# Patient Record
Sex: FEMALE | Race: WHITE | NOT HISPANIC OR LATINO | Employment: UNEMPLOYED | URBAN - METROPOLITAN AREA
[De-identification: names, ages, dates, MRNs, and addresses within clinical notes are randomized per-mention and may not be internally consistent; named-entity substitution may affect disease eponyms.]

---

## 2017-02-09 ENCOUNTER — OFFICE VISIT (OUTPATIENT)
Dept: OBSTETRICS AND GYNECOLOGY | Facility: CLINIC | Age: 57
End: 2017-02-09
Payer: COMMERCIAL

## 2017-02-09 ENCOUNTER — HOSPITAL ENCOUNTER (OUTPATIENT)
Dept: RADIOLOGY | Facility: CLINIC | Age: 57
Discharge: HOME | End: 2017-02-09
Attending: OBSTETRICS & GYNECOLOGY
Payer: COMMERCIAL

## 2017-02-09 ENCOUNTER — TELEPHONE (OUTPATIENT)
Dept: UROLOGY | Facility: CLINIC | Age: 57
End: 2017-02-09

## 2017-02-09 ENCOUNTER — TELEPHONE (OUTPATIENT)
Dept: OBSTETRICS AND GYNECOLOGY | Facility: CLINIC | Age: 57
End: 2017-02-09

## 2017-02-09 VITALS
SYSTOLIC BLOOD PRESSURE: 140 MMHG | HEART RATE: 81 BPM | BODY MASS INDEX: 28.67 KG/M2 | WEIGHT: 142.19 LBS | DIASTOLIC BLOOD PRESSURE: 88 MMHG | HEIGHT: 59 IN

## 2017-02-09 DIAGNOSIS — R31.9 HEMATURIA: ICD-10-CM

## 2017-02-09 DIAGNOSIS — R39.9 UTI SYMPTOMS: ICD-10-CM

## 2017-02-09 DIAGNOSIS — N30.10 INTERSTITIAL CYSTITIS: ICD-10-CM

## 2017-02-09 DIAGNOSIS — R10.2 PELVIC PAIN IN FEMALE: ICD-10-CM

## 2017-02-09 DIAGNOSIS — N95.2 VAGINAL ATROPHY: Primary | ICD-10-CM

## 2017-02-09 DIAGNOSIS — Z12.31 OTHER SCREENING MAMMOGRAM: ICD-10-CM

## 2017-02-09 LAB
BILIRUB SERPL-MCNC: NORMAL MG/DL
BLOOD URINE, POC: 50
COLOR, POC UA: NORMAL
GLUCOSE UR QL STRIP: NORMAL
KETONES UR QL STRIP: NORMAL
LEUKOCYTE ESTERASE URINE, POC: NORMAL
NITRITE, POC UA: NORMAL
PH, POC UA: 5
PROTEIN, POC: NORMAL
SPECIFIC GRAVITY, POC UA: NORMAL
UROBILINOGEN, POC UA: NORMAL

## 2017-02-09 PROCEDURE — 81002 URINALYSIS NONAUTO W/O SCOPE: CPT | Mod: S$GLB,,, | Performed by: OBSTETRICS & GYNECOLOGY

## 2017-02-09 PROCEDURE — 87086 URINE CULTURE/COLONY COUNT: CPT

## 2017-02-09 PROCEDURE — 77063 BREAST TOMOSYNTHESIS BI: CPT | Mod: 26,,, | Performed by: RADIOLOGY

## 2017-02-09 PROCEDURE — 99999 PR PBB SHADOW E&M-EST. PATIENT-LVL III: CPT | Mod: PBBFAC,,, | Performed by: OBSTETRICS & GYNECOLOGY

## 2017-02-09 PROCEDURE — 99214 OFFICE O/P EST MOD 30 MIN: CPT | Mod: 25,S$GLB,, | Performed by: OBSTETRICS & GYNECOLOGY

## 2017-02-09 PROCEDURE — 77067 SCR MAMMO BI INCL CAD: CPT | Mod: TC

## 2017-02-09 PROCEDURE — 77067 SCR MAMMO BI INCL CAD: CPT | Mod: 26,,, | Performed by: RADIOLOGY

## 2017-02-09 RX ORDER — OMEPRAZOLE 20 MG/1
20 TABLET, DELAYED RELEASE ORAL DAILY
COMMUNITY

## 2017-02-09 RX ORDER — ACETAMINOPHEN 160 MG/5ML
200 SUSPENSION, ORAL (FINAL DOSE FORM) ORAL DAILY
COMMUNITY

## 2017-02-09 RX ORDER — MULTIVITAMIN
1 TABLET ORAL DAILY
COMMUNITY

## 2017-02-09 RX ORDER — ESTERIFIED ESTROGEN AND METHYLTESTOSTERONE .625; 1.25 MG/1; MG/1
1 TABLET ORAL DAILY
Qty: 30 TABLET | Refills: 5 | Status: SHIPPED | OUTPATIENT
Start: 2017-02-09 | End: 2017-11-13 | Stop reason: SDUPTHER

## 2017-02-09 NOTE — MR AVS SNAPSHOT
[facility]  [address] [place] [address]  Phone: [phone]                  Brenna Gaytan   2017 10:40 AM   Office Visit    Description:  Female : 1960   Provider:  Moses Venegas MD   Department:  [facility]           Reason for Visit     Pelvic Pain           Diagnoses this Visit        Comments    UTI symptoms    -  Primary            To Do List           Goals (5 Years of Data)     None      [facility]     [facility] Care [facility] -  Assistance  Registered nurses in the [facility] On Call Center provide clinical advisement, health education, appointment booking, and other advisory services.  Call for this free service at [phone].             Medications           Message regarding Medications     Verify the changes and/or additions to your medication regime listed below are the same as discussed with your clinician today.  If any of these changes or additions are incorrect, please notify your healthcare provider.        STOP taking these medications     esomeprazole (NEXIUM) 40 MG capsule Take 40 mg by mouth before breakfast.           Verify that the below list of medications is an accurate representation of the medications you are currently taking.  If none reported, the list may be blank. If incorrect, please contact your healthcare provider. Carry this list with you in case of emergency.           Current Medications     coenzyme Q10 (CO Q-10) 200 mg capsule Take 200 mg by mouth once daily.    colestipol (COLESTID) 1 gram Tab Take 2 g by mouth once daily.     DOCOSAHEXANOIC ACID/EPA (FISH OIL ORAL) Take 2 capsules by mouth once daily.    estrogens,conjugated,-methyltestosterone 0.625-1.25mg (ESTRATEST HS) 0.625-1.25 mg per tablet Take 1 tablet by mouth once daily.    multivitamin (ONE DAILY MULTIVITAMIN) per tablet Take 1 tablet by mouth once daily.    omeprazole (PRILOSEC OTC) 20 MG tablet Take 20 mg by mouth once daily.          "  Clinical Reference Information           Your Vitals Were     BP Pulse Height Weight BMI    140/88 (BP Location: Right arm, Patient Position: Sitting, BP Method: Manual) 81 4' 11" (1.499 m) 64.5 kg (142 lb 3.2 oz) 28.72 kg/m2      Blood Pressure          Most Recent Value    BP  (!)  140/88      Allergies as of 2/9/2017     No Known Allergies      Immunizations Administered on Date of Encounter - 2/9/2017     None      Orders Placed During Today's Visit      Normal Orders This Visit    POCT URINE DIPSTICK WITHOUT MICROSCOPE     Urine culture          2/9/2017 10:48 AM - Maria Guadalupe Dennison LPN      Component Results     Component    Color, UA    Spec Grav, UA    pH, UA    5    WBC, UA    neg    Nitrite, UA    neg    Protein, UA    neg    Glucose, UA    neg    Ketones, UA    trace    Urobilinogen, UA    neg    Bilirubin, UA    neg    Blood, UA    50            Language Assistance Services     ATTENTION: Language assistance services are available, free of charge. Please call [phone].      ATENCIÓN: Si habla español, tiene a robles disposición servicios gratuitos de asistencia lingüística. Llame al [phone].     CHÚ Ý: N?u b?n nói Ti?ng Vi?t, có các d?ch v? h? tr? ngôn ng? mi?n phí dành cho b?n. G?i s? [phone].         [facility] complies with applicable Federal civil rights laws and does not discriminate on the basis of race, color, national origin, age, disability, or sex.        "

## 2017-02-09 NOTE — TELEPHONE ENCOUNTER
----- Message from Flor Lea sent at 2/9/2017 12:18 PM CST -----  Contact: self  Patient would like to be seen sooner than 02/15/16 due to blood in urine. Please call patient at [phone]. Thanks!

## 2017-02-09 NOTE — TELEPHONE ENCOUNTER
----- Message from Barry Mancuso sent at 2/9/2017 12:14 PM CST -----  Contact: pt  Pt is requesting callback, needs to know if her home medication was called in  Call Back#[phone]  Thanks

## 2017-02-09 NOTE — PROGRESS NOTES
"Chief Complaint   Patient presents with    Pelvic Pain       History of Present Illness   56 y.o.  female patient presents today for pelvic pain and bladder pain - request referral to new urology, will arrange. Has hematuria, h/o I.C. Not taking Hormone Replacement Therapy  Regularly and overdue for mammogram. No bowel complaints.     Past medical and surgical history reviewed.   I have reviewed the patient's medical history in detail and updated the computerized patient record.    Review of patient's allergies indicates:  No Known Allergies      Review of Systems - Negative except HPI  GEN ROS: negative for - chills or fever  Breast ROS: negative for breast lumps  Genito-Urinary ROS: no trouble voiding       Physical Examination:  Visit Vitals    BP (!) 140/88 (BP Location: Right arm, Patient Position: Sitting, BP Method: Manual)    Pulse 81    Ht 4' 11" (1.499 m)    Wt 64.5 kg (142 lb 3.2 oz)    BMI 28.72 kg/m2    Constitutional: She is oriented to person, place, and time. She appears well-developed and well-nourished. No distress.   HENT:   Head: Normocephalic and atraumatic.   Eyes: Conjunctivae and EOM are normal. No scleral icterus.   Neck: Normal range of motion. Neck supple. No tracheal deviation present.   Cardiovascular: Normal rate.    Pulmonary/Chest: Effort normal. No respiratory distress. She exhibits no tenderness.  Breasts: are symmetrical.   Right breast exhibits no inverted nipple, no mass, no nipple discharge, no skin change and no tenderness.   Left breast exhibits no inverted nipple, no mass, no nipple discharge, no skin change and no tenderness.  Abdominal: Soft. She exhibits no distension and no mass. There is no tenderness. There is no rebound and no guarding.   Genitourinary:    External rectal exam shows no thrombosed external hemorrhoids.    Pelvic exam was performed with patient supine.   No labial fusion.   There is no rash, lesion or injury on the right labia.   There is " no rash, lesion or injury on the left labia.   No bleeding and no signs of injury around the vaginal introitus, urethra is without lesions and well supported.    No vaginal discharge found. Pale and atrophic   No significant Cystocele, Enterocele or rectocele, and cuff well supported.   Bimanual exam:   The urethra and vagina are without palpable masses, tender over full bladder.    Uterus and cervix are surgically absents, vaginal cuff is intact and well supported.   Right adnexum displays no mass and no tenderness.   Left adnexum displays no mass and no tenderness.  Musculoskeletal: Normal range of motion.   Neurological: She is alert and oriented to person, place, and time. Coordination normal.   Skin: Skin is warm and dry. She is not diaphoretic.   Psychiatric: She has a normal mood and affect.          Assessment:  Vaginal atrophy - noncompliant with HRT  1. UTI symptoms  POCT URINE DIPSTICK WITHOUT MICROSCOPE    Urine culture    Ambulatory Referral to Urology   2. Other screening mammogram  CANCELED: Mammo Digital Screening Bilat With CAD   3. Hematuria  Ambulatory Referral to Urology   4. Interstitial cystitis  Ambulatory Referral to Urology       Plan:  estratest refill  Mammogram  Urine Culture  Urology referral

## 2017-02-10 ENCOUNTER — OFFICE VISIT (OUTPATIENT)
Dept: UROLOGY | Facility: CLINIC | Age: 57
End: 2017-02-10
Payer: COMMERCIAL

## 2017-02-10 VITALS
HEIGHT: 59 IN | HEART RATE: 85 BPM | SYSTOLIC BLOOD PRESSURE: 133 MMHG | WEIGHT: 142.19 LBS | DIASTOLIC BLOOD PRESSURE: 79 MMHG | BODY MASS INDEX: 28.67 KG/M2

## 2017-02-10 DIAGNOSIS — R31.29 HEMATURIA, MICROSCOPIC: ICD-10-CM

## 2017-02-10 DIAGNOSIS — R33.9 URINARY RETENTION: ICD-10-CM

## 2017-02-10 DIAGNOSIS — R31.9 HEMATURIA: Primary | ICD-10-CM

## 2017-02-10 LAB
BACTERIA UR CULT: NO GROWTH
BILIRUB SERPL-MCNC: NEGATIVE MG/DL
BLOOD URINE, POC: 50
COLOR, POC UA: NORMAL
GLUCOSE UR QL STRIP: NEGATIVE
KETONES UR QL STRIP: NEGATIVE
LEUKOCYTE ESTERASE URINE, POC: NEGATIVE
NITRITE, POC UA: NEGATIVE
PH, POC UA: 5
POC RESIDUAL URINE VOLUME: 182 ML (ref 0–100)
PROTEIN, POC: NEGATIVE
SPECIFIC GRAVITY, POC UA: 1.01
UROBILINOGEN, POC UA: NEGATIVE

## 2017-02-10 PROCEDURE — 99204 OFFICE O/P NEW MOD 45 MIN: CPT | Mod: 25,S$GLB,, | Performed by: UROLOGY

## 2017-02-10 PROCEDURE — 99999 PR PBB SHADOW E&M-EST. PATIENT-LVL III: CPT | Mod: PBBFAC,,, | Performed by: UROLOGY

## 2017-02-10 PROCEDURE — 81002 URINALYSIS NONAUTO W/O SCOPE: CPT | Mod: S$GLB,,, | Performed by: UROLOGY

## 2017-02-10 PROCEDURE — 51798 US URINE CAPACITY MEASURE: CPT | Mod: S$GLB,,, | Performed by: UROLOGY

## 2017-02-10 RX ORDER — DIAZEPAM 5 MG/1
5 TABLET ORAL ONCE
Qty: 1 TABLET | Refills: 0 | Status: SHIPPED | OUTPATIENT
Start: 2017-02-10 | End: 2018-06-29

## 2017-02-10 NOTE — LETTER
February 10, 2017      Moses Venegas MD  [address] [facility]  [address]  [facility] [address]           [facility]  [address] [facility] [address] [place] [address]  Phone: [phone]          Patient: Brenna Gaytan   MR Number: [id number]   YOB: 1960   Date of Visit: 2/10/2017       Dear Dr. Moses Venegas:    Thank you for referring Brenna Gaytan to me for evaluation. Attached you will find relevant portions of my assessment and plan of care.    If you have questions, please do not hesitate to call me. I look forward to following Brenna Gaytan along with you.    Sincerely,    OLIVIA Souza MD    Enclosure  CC:  No Recipients    If you would like to receive this communication electronically, please contact [email or web address] or [phone] to request more information on [facility] Link access.    For providers and/or their staff who would like to refer a patient to [facility], please contact us through our one-stop-shop provider referral line, [facility] , at [phone].    If you feel you have received this communication in error or would no longer like to receive these types of communications, please e-mail [email or web address]

## 2017-02-10 NOTE — PROGRESS NOTES
Subjective:       Patient ID: Brenna Gaytan is a 56 y.o. female.    Chief Complaint: Hematuria    HPI     56 year old with a ?history of IC.  She has had previous bladder dilation 8 years ago.  She is here for another opinion.  She initially presented with urinary frequency.  She has no bladder pain and she's had no flareups in the last.  She does note some pelvic pain especially after her GYN exam.  She denies dysuria.  She has urgency occasional urge incontinence which has been a chronic problem.  No pain with bladder filling.  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  Microhematuria first noticed last few months.  No gross hematuria.  Never been a smoker.   No previous kidney stones.   Renal US last week in urologist office was reportedly negative.  She feels fatigue.  She feels numb.  Normal BM.  She denies constipation.    Urine dipstick shows positive for 2+ bloor.  Micro exam: 3-5 RBC's per HPF.  post void residual: 182 ml    Past Medical History   Diagnosis Date    Hyperlipidemia     MALT lymphoma      Past Surgical History   Procedure Laterality Date    Cholecystectomy      Hysterectomy      Oophorectomy      Appendectomy         Current Outpatient Prescriptions:     coenzyme Q10 (CO Q-10) 200 mg capsule, Take 200 mg by mouth once daily., Disp: , Rfl:     colestipol (COLESTID) 1 gram Tab, Take 2 g by mouth once daily. , Disp: , Rfl: 10    DOCOSAHEXANOIC ACID/EPA (FISH OIL ORAL), Take 2 capsules by mouth once daily., Disp: , Rfl:     estrogens,conjugated,-methyltestosterone 0.625-1.25mg (ESTRATEST HS) 0.625-1.25 mg per tablet, Take 1 tablet by mouth once daily., Disp: 30 tablet, Rfl: 5    multivitamin (ONE DAILY MULTIVITAMIN) per tablet, Take 1 tablet by mouth once daily., Disp: , Rfl:     omeprazole (PRILOSEC OTC) 20 MG tablet, Take 20 mg by mouth once daily., Disp: , Rfl:     diazePAM (VALIUM) 5 MG tablet, Take 1 tablet (5 mg total) by mouth once., Disp: 1 tablet, Rfl:  0        Review of Systems   Constitutional: Negative for fever.   Eyes: Negative for visual disturbance.   Respiratory: Negative for shortness of breath.    Cardiovascular: Negative for chest pain.   Gastrointestinal: Negative for nausea.   Genitourinary: Negative for dysuria and hematuria.   Musculoskeletal: Negative for gait problem.   Skin: Negative for rash.   Neurological: Negative for seizures.   Psychiatric/Behavioral: Negative for confusion.       Objective:      Physical Exam   Constitutional: She is oriented to person, place, and time. She appears well-developed and well-nourished.   HENT:   Head: Normocephalic.   Eyes: Conjunctivae and EOM are normal.   Neck: Normal range of motion.   Cardiovascular: Normal rate.    Pulmonary/Chest: Effort normal.   Abdominal: Soft. She exhibits no distension and no mass. There is no tenderness.   Genitourinary:   Genitourinary Comments: Bladder non-tender and nondistended  No CVA tenderness   Musculoskeletal: She exhibits no edema.   Neurological: She is alert and oriented to person, place, and time.   Skin: Skin is warm and dry. No rash noted.   Psychiatric: She has a normal mood and affect. Her behavior is normal.   Vitals reviewed.      Assessment:       1. Hematuria    2. Hematuria, microscopic    3. Urinary retention        Plan:       Hematuria  -     POCT URINE DIPSTICK WITHOUT MICROSCOPE  -     Cystoscopy; Future  -     POCT Bladder Scan    Hematuria, microscopic  -     diazePAM (VALIUM) 5 MG tablet; Take 1 tablet (5 mg total) by mouth once.  Dispense: 1 tablet; Refill: 0  -     US Retroperitoneal Complete (Kidney and; Future; Expected date: 2/10/17  -     Cancel: Urine culture  -     Cystoscopy; Future  -     POCT Bladder Scan    Urinary retention  -     Cystoscopy; Future  -     POCT Bladder Scan

## 2017-02-10 NOTE — MR AVS SNAPSHOT
[facility]  [address] [facility] [address]  [facility] [address]  Phone: [phone]                  Brenna Gaytan   2/10/2017 9:45 AM   Office Visit    Description:  Female : 1960   Provider:  OLIVIA Souza MD   Department:  [facility]           Reason for Visit     Hematuria           Diagnoses this Visit        Comments    Hematuria    -  Primary            To Do List           Goals (5 Years of Data)     None      [facility]     [facility] On Call Nurse Care Line -  Assistance  Registered nurses in the [facility] On Call Center provide clinical advisement, health education, appointment booking, and other advisory services.  Call for this free service at [phone].             Medications           Message regarding Medications     Verify the changes and/or additions to your medication regime listed below are the same as discussed with your clinician today.  If any of these changes or additions are incorrect, please notify your healthcare provider.             Verify that the below list of medications is an accurate representation of the medications you are currently taking.  If none reported, the list may be blank. If incorrect, please contact your healthcare provider. Carry this list with you in case of emergency.           Current Medications     coenzyme Q10 (CO Q-10) 200 mg capsule Take 200 mg by mouth once daily.    colestipol (COLESTID) 1 gram Tab Take 2 g by mouth once daily.     DOCOSAHEXANOIC ACID/EPA (FISH OIL ORAL) Take 2 capsules by mouth once daily.    estrogens,conjugated,-methyltestosterone 0.625-1.25mg (ESTRATEST HS) 0.625-1.25 mg per tablet Take 1 tablet by mouth once daily.    multivitamin (ONE DAILY MULTIVITAMIN) per tablet Take 1 tablet by mouth once daily.    omeprazole (PRILOSEC OTC) 20 MG tablet Take 20 mg by mouth once daily.           Clinical Reference Information           Your Vitals Were     BP Pulse Height Weight BMI    133/79 (BP Location: Right arm,  "Patient Position: Sitting, BP Method: Automatic) 85 4' 11" (1.499 m) 64.5 kg (142 lb 3.2 oz) 28.72 kg/m2      Blood Pressure          Most Recent Value    BP  133/79 [Large cuff]      Allergies as of 2/10/2017     No Known Allergies      Immunizations Administered on Date of Encounter - 2/10/2017     None      Orders Placed During Today's Visit      Normal Orders This Visit    POCT URINE DIPSTICK WITHOUT MICROSCOPE          2/10/2017 10:18 AM - Marta Chatterjee LPN      Component Results     Component    Color, UA    Yellow/Clear    Spec Grav, UA    1.010    pH, UA    5    WBC, UA    Negative    Nitrite, UA    Negative    Protein, UA    Negative    Glucose, UA    Negative    Ketones, UA    Negative    Urobilinogen, UA    Negative    Bilirubin, UA    Negative    Blood, UA    50            Language Assistance Services     ATTENTION: Language assistance services are available, free of charge. Please call [phone].      ATENCIÓN: Si habla español, tiene a robles disposición servicios gratuitos de asistencia lingüística. Llame al [phone].     CHÚ Ý: N?u b?n nói Ti?ng Vi?t, có các d?ch v? h? tr? ngôn ng? mi?n phí dành cho b?n. G?i s? [phone].         [facility] complies with applicable Federal civil rights laws and does not discriminate on the basis of race, color, national origin, age, disability, or sex.        "

## 2017-02-24 ENCOUNTER — HOSPITAL ENCOUNTER (OUTPATIENT)
Dept: RADIOLOGY | Facility: HOSPITAL | Age: 57
Discharge: HOME | End: 2017-02-24
Attending: UROLOGY
Payer: COMMERCIAL

## 2017-02-24 DIAGNOSIS — R31.29 HEMATURIA, MICROSCOPIC: ICD-10-CM

## 2017-02-24 PROCEDURE — 76770 US EXAM ABDO BACK WALL COMP: CPT | Mod: 26,,, | Performed by: RADIOLOGY

## 2017-02-24 PROCEDURE — 76770 US EXAM ABDO BACK WALL COMP: CPT | Mod: TC,PO

## 2017-03-01 ENCOUNTER — TELEPHONE (OUTPATIENT)
Dept: UROLOGY | Facility: CLINIC | Age: 57
End: 2017-03-01

## 2017-03-01 NOTE — TELEPHONE ENCOUNTER
----- Message from Seema Lovell sent at 3/1/2017 10:43 AM CST -----  Contact: self  Patient would like to reschedule cystoscopy appointment.   Patient  can be reached at [phone]

## 2017-03-03 ENCOUNTER — TELEPHONE (OUTPATIENT)
Dept: PAIN MEDICINE | Facility: CLINIC | Age: 57
End: 2017-03-03

## 2017-03-03 NOTE — TELEPHONE ENCOUNTER
----- Message from OLIVIA Souza MD sent at 3/3/2017  8:59 AM CST -----  Call with US results.  Normal kidneys.  Keep f/u from cystoscopy.

## 2017-03-14 ENCOUNTER — TELEPHONE (OUTPATIENT)
Dept: UROLOGY | Facility: CLINIC | Age: 57
End: 2017-03-14

## 2017-03-14 NOTE — TELEPHONE ENCOUNTER
----- Message from Heather Eason sent at 3/14/2017 12:57 PM CDT -----  Contact: Pt  Pt is calling to possibly r/s appt on 3/17/17 for tomorrow.  Pt can be reached at [phone].    Thank you

## 2017-03-14 NOTE — TELEPHONE ENCOUNTER
Spoke to pt and she wrote down the wrong date for her cysto. Dr. Souza has no spots available to do a cysto on Thursday. I told her if he gets a cancellation we will let her know. She is going to keep her apt for Friday as of right now.

## 2017-03-17 ENCOUNTER — TELEPHONE (OUTPATIENT)
Dept: UROLOGY | Facility: CLINIC | Age: 57
End: 2017-03-17

## 2017-03-17 ENCOUNTER — PROCEDURE VISIT (OUTPATIENT)
Dept: UROLOGY | Facility: CLINIC | Age: 57
End: 2017-03-17
Payer: COMMERCIAL

## 2017-03-17 VITALS
HEIGHT: 59 IN | DIASTOLIC BLOOD PRESSURE: 96 MMHG | WEIGHT: 143.31 LBS | HEART RATE: 96 BPM | SYSTOLIC BLOOD PRESSURE: 144 MMHG | BODY MASS INDEX: 28.89 KG/M2

## 2017-03-17 DIAGNOSIS — R31.9 HEMATURIA: ICD-10-CM

## 2017-03-17 DIAGNOSIS — R35.0 INCREASED URINARY FREQUENCY: Primary | ICD-10-CM

## 2017-03-17 DIAGNOSIS — R31.29 HEMATURIA, MICROSCOPIC: ICD-10-CM

## 2017-03-17 LAB
BILIRUB SERPL-MCNC: NORMAL MG/DL
BLOOD URINE, POC: 50
COLOR, POC UA: YELLOW
GLUCOSE UR QL STRIP: NORMAL
KETONES UR QL STRIP: NORMAL
LEUKOCYTE ESTERASE URINE, POC: NORMAL
NITRITE, POC UA: NORMAL
PH, POC UA: 5
PROTEIN, POC: NORMAL
SPECIFIC GRAVITY, POC UA: 1.02
UROBILINOGEN, POC UA: NORMAL

## 2017-03-17 PROCEDURE — 81002 URINALYSIS NONAUTO W/O SCOPE: CPT | Mod: S$GLB,,, | Performed by: UROLOGY

## 2017-03-17 PROCEDURE — 52000 CYSTOURETHROSCOPY: CPT | Mod: S$GLB,,, | Performed by: UROLOGY

## 2017-03-17 RX ORDER — OXYBUTYNIN CHLORIDE 5 MG/1
5 TABLET, EXTENDED RELEASE ORAL DAILY
Qty: 30 TABLET | Refills: 11 | Status: SHIPPED | OUTPATIENT
Start: 2017-03-17 | End: 2018-05-28 | Stop reason: SDUPTHER

## 2017-03-17 NOTE — TELEPHONE ENCOUNTER
----- Message from Sara Jackson sent at 3/17/2017 10:13 AM CDT -----  Contact: [phone]  Patient is requesting a call back from the nurse stated she need direction on taking a pill diazepam prior to procedure today, what time to take pill?  Placed call to pod no answer.    Please call the patient upon request at phone number [phone].

## 2017-03-17 NOTE — MR AVS SNAPSHOT
[facility]  [address]  [place] [address]  Phone: [phone]                  Brenna Gaytan   3/17/2017 1:30 PM   Procedure visit    Description:  Female : 1960   Provider:  OLIVIA Souza MD   Department:  [facility]           Reason for Visit     Follow-up           Diagnoses this Visit        Comments    Increased urinary frequency    -  Primary     Hematuria         Hematuria, microscopic                To Do List           Goals (5 Years of Data)     None       These Medications        Disp Refills Start End    oxybutynin (DITROPAN-XL) 5 MG TR24 30 tablet 11 3/17/2017 3/17/2018    Take 1 tablet (5 mg total) by mouth once daily. - Oral    Pharmacy: [facility] [address] [place] [address] [place] [address] JIMMY HUERTA AT [facility]  #: [phone]         [facility] On Call     [facility] On Call Nurse Care Line -  Assistance  Registered nurses in the [facility] On Call Center provide clinical advisement, health education, appointment booking, and other advisory services.  Call for this free service at [phone].             Medications           Message regarding Medications     Verify the changes and/or additions to your medication regime listed below are the same as discussed with your clinician today.  If any of these changes or additions are incorrect, please notify your healthcare provider.        START taking these NEW medications        Refills    oxybutynin (DITROPAN-XL) 5 MG TR24 11    Sig: Take 1 tablet (5 mg total) by mouth once daily.    Class: Normal    Route: Oral           Verify that the below list of medications is an accurate representation of the medications you are currently taking.  If none reported, the list may be blank. If incorrect, please contact your healthcare provider. Carry this list with you in case of emergency.           Current Medications     coenzyme Q10 (CO Q-10) 200 mg capsule Take 200 mg by mouth once daily.     "colestipol (COLESTID) 1 gram Tab Take 2 g by mouth once daily.     diazePAM (VALIUM) 5 MG tablet Take 1 tablet (5 mg total) by mouth once.    DOCOSAHEXANOIC ACID/EPA (FISH OIL ORAL) Take 2 capsules by mouth once daily.    estrogens,conjugated,-methyltestosterone 0.625-1.25mg (ESTRATEST HS) 0.625-1.25 mg per tablet Take 1 tablet by mouth once daily.    multivitamin (ONE DAILY MULTIVITAMIN) per tablet Take 1 tablet by mouth once daily.    omeprazole (PRILOSEC OTC) 20 MG tablet Take 20 mg by mouth once daily.    oxybutynin (DITROPAN-XL) 5 MG TR24 Take 1 tablet (5 mg total) by mouth once daily.           Clinical Reference Information           Your Vitals Were     BP Pulse Height Weight BMI    144/96 (BP Location: Right arm, Patient Position: Sitting) 96 4' 11" (1.499 m) 65 kg (143 lb 4.8 oz) 28.94 kg/m2      Blood Pressure          Most Recent Value    BP  (!)  144/96      Allergies as of 3/17/2017     No Known Allergies      Immunizations Administered on Date of Encounter - 3/17/2017     None      Orders Placed During Today's Visit      Normal Orders This Visit    Cystoscopy     POCT URINE DIPSTICK WITHOUT MICROSCOPE          3/17/2017  2:41 PM - Isela Lindsey      Component Results     Component    Color    yellow    Spec Grav    1.020    pH, UA    5    WBC, UA    neg    Nitrite    neg    Protein    neg    Glucose, UA    neg    Ketones, UA    neg    Urobilinogen    neg    Bilirubin    neg    Blood, UA    50            Language Assistance Services     ATTENTION: Language assistance services are available, free of charge. Please call [phone].      ATENCIÓN: Si habla español, tiene a robles disposición servicios gratuitos de asistencia lingüística. Llame al [phone].     [facility] Ý: N?u b?n nói Ti?ng Vi?t, có các d?ch v? h? tr? ngôn ng? mi?n phí dành cho b?n. G?i s? [phone].         [facility] complies with applicable Federal civil rights laws and does not discriminate on the basis of race, color, " national origin, age, disability, or sex.

## 2017-03-17 NOTE — TELEPHONE ENCOUNTER
Spoke to pt and told her not to take the pill until after she signs the consents in the office. She verbalized understanding.

## 2017-03-17 NOTE — PROCEDURES
"Cystoscopy  Date/Time: 3/17/2017 4:26 PM  Performed by: OLIVIA FELICIANO  Authorized by: OLIVIA FELICIANO     Consent Done?:  Yes (Written)  Time out: Immediately prior to procedure a "time out" was called to verify the correct patient, procedure, equipment, support staff and site/side marked as required.    Indications: incontinence and hematuria    Position:  Other  Anesthesia:  Lidocaine jelly  Patient sedated?: No    Preparation: Patient was prepped and draped in usual sterile fashion      Scope type:  Flexible cystoscope    Patient tolerance:  Patient tolerated the procedure well with no immediate complications      The patient was placed in the frog-leg position.  The genitals were prepped and draped in a sterile fashion.  Viscous lidocaine jelly was intsilled into the urethra.  The uretrhal was dilated with sounds to 22 [place].  Using a flexible scope, a careful cystoscopic exam was then performed.  The entire bladder mucosa was systematically visualized.  The mucosa appeared normal.  There were no lesions, masses foreign bodies or stones.   Each ureteral orifices were visualized and both had clear efflux of urine.  The cystoscope was then removed and I examined the entire length of the urethra.  The urethra appeared normal.  She tolerated the procedure well.  There were no complications.    Impression:  Normal cystoscopy.    Renal US also reviewed and normal.    Rec Trial of oxybutynin for urinary frequency.  Origin of microhematuria which has been a chronic finding is unknown.  She is very anxious.  Refer to nephrology.      "

## 2017-10-31 ENCOUNTER — OFFICE VISIT (OUTPATIENT)
Dept: NEPHROLOGY | Facility: CLINIC | Age: 57
End: 2017-10-31
Payer: COMMERCIAL

## 2017-10-31 VITALS
HEART RATE: 80 BPM | WEIGHT: 147.69 LBS | BODY MASS INDEX: 29.77 KG/M2 | HEIGHT: 59 IN | RESPIRATION RATE: 17 BRPM | SYSTOLIC BLOOD PRESSURE: 130 MMHG | DIASTOLIC BLOOD PRESSURE: 80 MMHG

## 2017-10-31 DIAGNOSIS — R94.4 NONSPECIFIC ABNORMAL RESULTS OF KIDNEY FUNCTION STUDY: Primary | ICD-10-CM

## 2017-10-31 PROCEDURE — 99244 OFF/OP CNSLTJ NEW/EST MOD 40: CPT | Mod: S$GLB,,, | Performed by: INTERNAL MEDICINE

## 2017-10-31 PROCEDURE — 99999 PR PBB SHADOW E&M-EST. PATIENT-LVL II: CPT | Mod: PBBFAC,,, | Performed by: INTERNAL MEDICINE

## 2017-10-31 RX ORDER — IBUPROFEN 800 MG/1
TABLET ORAL
Refills: 0 | COMMUNITY
Start: 2017-10-10

## 2017-10-31 RX ORDER — DICLOFENAC SODIUM 75 MG/1
75 TABLET, DELAYED RELEASE ORAL
COMMUNITY
Start: 2017-10-18 | End: 2018-01-30

## 2017-10-31 RX ORDER — METHOCARBAMOL 500 MG/1
500 TABLET, FILM COATED ORAL 3 TIMES DAILY PRN
Refills: 0 | COMMUNITY
Start: 2017-10-19

## 2017-10-31 RX ORDER — FLUTICASONE PROPIONATE 50 MCG
2 SPRAY, SUSPENSION (ML) NASAL DAILY PRN
Refills: 0 | COMMUNITY
Start: 2017-10-19

## 2017-10-31 NOTE — LETTER
October 31, 2017      OLIVIA Souza MD  [address] [facility] [address] [place] [address]           [facility]  [address] [facility] [place] [address]  Phone: [phone]          Patient: Brenna Gaytan   MR Number: [id number]   YOB: 1960   Date of Visit: 10/31/2017       Dear Dr. OLIVIA Souza:    Thank you for referring Brenna Gaytan to me for evaluation. Attached you will find relevant portions of my assessment and plan of care.    If you have questions, please do not hesitate to call me. I look forward to following Brenna Gaytan along with you.    Sincerely,    Kevan May MD    Enclosure  CC:  No Recipients    If you would like to receive this communication electronically, please contact [email or web address] or [phone] to request more information on [employer] Link access.    For providers and/or their staff who would like to refer a patient to [facility], please contact us through our one-stop-shop provider referral line, [facility], at [phone].    If you feel you have received this communication in error or would no longer like to receive these types of communications, please e-mail [email or web address]

## 2017-10-31 NOTE — PROGRESS NOTES
Subjective:       Patient ID: Brenna Gaytan is a 57 y.o. White female who presents for new patient evaluation for hematuria.    Brenna Gaytan is referred by Alton Eagle MD to be evaluated for hematuria.  She has been told that she has had microscopic hematuria for years but has not been told she has any renal disease.  She has a negative cystoscopy in March of this year.  She occasionally has UTIs but has no urinary symptoms currently.  There have been no recent illnesses, hospitalizations or procedures.          Review of Systems   Constitutional: Negative for appetite change, chills and fever.   Eyes: Negative for visual disturbance.   Respiratory: Negative for cough and shortness of breath.    Cardiovascular: Negative for chest pain and leg swelling.   Gastrointestinal: Negative for diarrhea, nausea and vomiting.   Genitourinary: Negative for difficulty urinating, dysuria and hematuria.   Musculoskeletal: Negative for myalgias.   Skin: Negative for rash.   Neurological: Negative for headaches.   Psychiatric/Behavioral: Negative for sleep disturbance.       The past medical, family and social histories were reviewed for this encounter.     Past Medical History:   Diagnosis Date    Hyperlipidemia     MALT lymphoma      Past Surgical History:   Procedure Laterality Date    APPENDECTOMY      CHOLECYSTECTOMY      HYSTERECTOMY      OOPHORECTOMY       Social History     Social History    Marital status:      Spouse name: N/A    Number of children: N/A    Years of education: N/A     Occupational History    Not on file.     Social History Main Topics    Smoking status: Never Smoker    Smokeless tobacco: Never Used    Alcohol use Yes      Comment: rarely     Drug use: No    Sexual activity: Yes     Partners: Male     Other Topics Concern    Not on file     Social History Narrative    No narrative on file     Current Outpatient Prescriptions   Medication Sig    diclofenac (VOLTAREN) 75 MG EC  "tablet Take 75 mg by mouth.    coenzyme Q10 (CO Q-10) 200 mg capsule Take 200 mg by mouth once daily.    colestipol (COLESTID) 1 gram Tab Take 2 g by mouth once daily.     diazePAM (VALIUM) 5 MG tablet Take 1 tablet (5 mg total) by mouth once.    DOCOSAHEXANOIC ACID/EPA (FISH OIL ORAL) Take 2 capsules by mouth once daily.    estrogens,conjugated,-methyltestosterone 0.625-1.25mg (ESTRATEST HS) 0.625-1.25 mg per tablet Take 1 tablet by mouth once daily.    fluticasone (FLONASE) 50 mcg/actuation nasal spray 2 sprays by Each Nare route once daily.    ibuprofen (ADVIL,MOTRIN) 800 MG tablet TK 1 T PO Q 6 H PRN P    methocarbamol (ROBAXIN) 500 MG Tab Take 500 mg by mouth 3 (three) times daily.    multivitamin (ONE DAILY MULTIVITAMIN) per tablet Take 1 tablet by mouth once daily.    omeprazole (PRILOSEC OTC) 20 MG tablet Take 20 mg by mouth once daily.    oxybutynin (DITROPAN-XL) 5 MG TR24 Take 1 tablet (5 mg total) by mouth once daily.     No current facility-administered medications for this visit.        Resp 17   Ht 4' 11" (1.499 m)   Wt 67 kg (147 lb 11.3 oz)   BMI 29.83 kg/m²     Objective:      Physical Exam   Constitutional: She is oriented to person, place, and time. She appears well-developed and well-nourished. No distress.   HENT:   Head: Normocephalic and atraumatic.   Eyes: Conjunctivae are normal.   Neck: Neck supple. No JVD present.   Cardiovascular: Normal rate, regular rhythm and normal heart sounds.  Exam reveals no gallop and no friction rub.    No murmur heard.  Pulmonary/Chest: Effort normal and breath sounds normal. No respiratory distress. She has no wheezes. She has no rales.   Abdominal: Soft. Bowel sounds are normal. She exhibits no distension. There is no tenderness.   Musculoskeletal: She exhibits no edema.   Neurological: She is alert and oriented to person, place, and time.   Skin: Skin is warm and dry. No rash noted.   Psychiatric: She has a normal mood and affect.   Vitals " reviewed.      Assessment:       1. Nonspecific abnormal results of kidney function study        Plan:   Return to clinic prn.  Labs for next week include rp, ua.  Baseline creatinine is normal.  He has a normal renal ultrasound and had a normal cystoscopy.  I will check her renal panel.  She has normal blood pressure.  She has no chronic illnesses which would predispose her to renal disease.  His most recent urine in clinic showed 2+ dipstick blood but 3-5 RBC microscopically.

## 2017-11-06 ENCOUNTER — TELEPHONE (OUTPATIENT)
Dept: NEUROLOGY | Facility: CLINIC | Age: 57
End: 2017-11-06

## 2017-11-06 NOTE — TELEPHONE ENCOUNTER
----- Message from Trice Milian sent at 11/3/2017 11:21 AM CDT -----  Contact: Patient  Brenna, patient [phone] calling in regards to a referral that was sent to office from Dr. Hazel, the office said that they faxed the referral twice and patient is trying to schedule an appointment and is waiting on a call back. Please advise patient. Thanks.

## 2017-11-06 NOTE — TELEPHONE ENCOUNTER
Left message to call.  Referral in EPIC.  Need more explanation as to why the patient needs to be seen.

## 2017-11-07 ENCOUNTER — TELEPHONE (OUTPATIENT)
Dept: NEUROLOGY | Facility: CLINIC | Age: 57
End: 2017-11-07

## 2017-11-07 NOTE — TELEPHONE ENCOUNTER
----- Message from Sara Jackson sent at 11/2/2017 12:03 PM CDT -----  Contact: self [phone]  Patient is requesting a call back from the nurse stated she fell, have head trauma, referred by pcp, she stated a referral was sent over, no referral shown in epic.  She is frustrated, no one has called her, stated pcp sent referral and she called.     Please call the patient upon request at phone number [phone].

## 2017-11-07 NOTE — TELEPHONE ENCOUNTER
----- Message from Liliana William sent at 11/6/2017  4:37 PM CST -----  Contact: patient  Patient states that she is returning the call, trying to get in for an appointment,  and you can call her as early as possible if you can, at [phone].  Thank you    The patient can come in Thursday, 11/09/2017 anytime.  You can leave a message as to what time to come in, preferably in the morning or afternoon.

## 2017-11-07 NOTE — TELEPHONE ENCOUNTER
----- Message from Catherine Dobson sent at 11/6/2017 10:47 AM CST -----  Contact: Patient  Patient is returning a call from the doctors office.  Patient has been trying to get an appointment with neurology.   Call Back# [phone]  Thanks

## 2017-11-14 RX ORDER — ESTROGENS, ESTERIFIED AND METHYLTESTOSTERONE 1.25; .625 MG/1; MG/1
1 TABLET, COATED ORAL DAILY
Qty: 30 TABLET | Refills: 3 | Status: SHIPPED | OUTPATIENT
Start: 2017-11-14 | End: 2018-05-29

## 2017-11-17 ENCOUNTER — TELEPHONE (OUTPATIENT)
Dept: NEUROLOGY | Facility: CLINIC | Age: 57
End: 2017-11-17

## 2017-11-17 NOTE — TELEPHONE ENCOUNTER
----- Message from Sara Jackson sent at 11/17/2017  1:59 PM CST -----  Contact: [phone]  Patient is returning nurse's phone call.  Please call patient back at [phone].

## 2017-11-17 NOTE — TELEPHONE ENCOUNTER
----- Message from Wing Joseph sent at 11/17/2017  1:00 PM CST -----  Contact: pt   PT is requesting to reschedule appointment for 11/27/2017 anytime no dates available in EPIC      Pt call be reached  [phone]

## 2017-11-17 NOTE — TELEPHONE ENCOUNTER
Spoke with patient. She stated she will not be in town on 11/22. Explained that Dr. Wiggins was booked out the next week but that I would see what I could do for her. Rescheduled her for 11/30/17 at 11:00 am; attempted to call her back to confirm this was a time she could do but left voicemail.

## 2017-11-22 ENCOUNTER — TELEPHONE (OUTPATIENT)
Dept: NEUROLOGY | Facility: CLINIC | Age: 57
End: 2017-11-22

## 2017-11-30 ENCOUNTER — TELEPHONE (OUTPATIENT)
Dept: NEUROLOGY | Facility: CLINIC | Age: 57
End: 2017-11-30

## 2017-11-30 NOTE — TELEPHONE ENCOUNTER
----- Message from Seema Lovell sent at 11/30/2017 11:48 AM CST -----  Contact: self  Patient states need to speak with nurse received text for sooner appointment date   Pt states sooner appointment was not mention in text received    Please call pt at [phone]     FYI:  I tried to assist patient with appointment

## 2017-12-06 ENCOUNTER — DOCUMENTATION ONLY (OUTPATIENT)
Dept: NEPHROLOGY | Facility: CLINIC | Age: 57
End: 2017-12-06

## 2018-01-30 ENCOUNTER — OFFICE VISIT (OUTPATIENT)
Dept: NEUROLOGY | Facility: CLINIC | Age: 58
End: 2018-01-30
Payer: COMMERCIAL

## 2018-01-30 VITALS
BODY MASS INDEX: 29.33 KG/M2 | SYSTOLIC BLOOD PRESSURE: 145 MMHG | HEART RATE: 73 BPM | DIASTOLIC BLOOD PRESSURE: 86 MMHG | WEIGHT: 145.5 LBS | RESPIRATION RATE: 17 BRPM | HEIGHT: 59 IN

## 2018-01-30 DIAGNOSIS — G44.86 CERVICOGENIC HEADACHE: Primary | ICD-10-CM

## 2018-01-30 DIAGNOSIS — M54.81 BILATERAL OCCIPITAL NEURALGIA: ICD-10-CM

## 2018-01-30 PROCEDURE — 99204 OFFICE O/P NEW MOD 45 MIN: CPT | Mod: S$GLB,,, | Performed by: PSYCHIATRY & NEUROLOGY

## 2018-01-30 PROCEDURE — 99999 PR PBB SHADOW E&M-EST. PATIENT-LVL IV: CPT | Mod: PBBFAC,,, | Performed by: PSYCHIATRY & NEUROLOGY

## 2018-01-30 PROCEDURE — 3008F BODY MASS INDEX DOCD: CPT | Mod: S$GLB,,, | Performed by: PSYCHIATRY & NEUROLOGY

## 2018-01-30 RX ORDER — DULOXETIN HYDROCHLORIDE 20 MG/1
20 CAPSULE, DELAYED RELEASE ORAL DAILY
Qty: 30 CAPSULE | Refills: 2 | Status: SHIPPED | OUTPATIENT
Start: 2018-01-30 | End: 2018-05-29

## 2018-01-30 NOTE — PROGRESS NOTES
Subjective:      2/5/2018       Patient ID: Brenna Gaytan is a right handed 57 y.o.  female who presents for fall and head injury    History of Present Illness    Reviewed records from Dr. Alton Hazel's office Oct 26, 2017.  Patient reported having had a fall, head hit her head and had hypertensive blood pressure readings.  Seen in emergency room, CT scan of the head was performed which was reportedly negative.  Positive history of nausea, blurry vision and headache.  Was prescribed methocarbamol and diclofenac.    Reviewed records and care everywhere.  October 18, 2017 she reported to the emergency room at [place] after a slip and fall in which she struck her head on the floor at a [employer].  Denied loss of consciousness.  Positive nausea, dizziness, hazy feeling.  Denied blurred vision, diplopia, weakness, numbness, fever, congestion, cough, or vomiting.  Her blood pressure was 175/89.  She has a 4 cm contusion to the occipital region on her scalp with tenderness to palpation.  Her neurological examination was normal.  CT scan of the head significant for minimal, generalized deepening of the cortical sulci, no acute abnormalities.    She recalls being in the store, slipped on a clothes  on the floor, tried to grab something to break her fall, hit back of her head.     Her concerns since that time, she states she may forget where she is going or where she is when driving; may feel like she is swaying or might pass out but has not had any syncope.  Vision may be blurry from time to time.  Constant achyness, tenderness in the occipital region.  MM relaxer does help some, takes at night for fear of side effects.    Lives independently,  often is out of town on work.  She manages the finances at home, shopping and cooking, independent in all aspects of living.      Feeling much more anxiety since the episode.      No prior hx of concussion, meningitis, stroke or seizure.    She is seeing a  chiropractor.  Had seen a neurologist near [facility] prior to this visit on two occasions. Does not recall his name.  Was dx with post-concussion syndrome?    Review of patient's allergies indicates:  No Known Allergies  Current Outpatient Prescriptions   Medication Sig Dispense Refill    coenzyme Q10 (CO Q-10) 200 mg capsule Take 200 mg by mouth once daily.      colestipol (COLESTID) 1 gram Tab Take 2 g by mouth once daily.   10    DOCOSAHEXANOIC ACID/EPA (FISH OIL ORAL) Take 2 capsules by mouth once daily.      EEMT HS 0.625-1.25 mg per tablet TAKE 1 TABLET BY MOUTH ONCE DAILY 30 tablet 3    fluticasone (FLONASE) 50 mcg/actuation nasal spray 2 sprays by Each Nare route once daily.  0    ibuprofen (ADVIL,MOTRIN) 800 MG tablet TK 1 T PO Q 6 H PRN P  0    methocarbamol (ROBAXIN) 500 MG Tab Take 500 mg by mouth 3 (three) times daily.  0    multivitamin (ONE DAILY MULTIVITAMIN) per tablet Take 1 tablet by mouth once daily.      omeprazole (PRILOSEC OTC) 20 MG tablet Take 20 mg by mouth once daily.      oxybutynin (DITROPAN-XL) 5 MG TR24 Take 1 tablet (5 mg total) by mouth once daily. 30 tablet 11    diazePAM (VALIUM) 5 MG tablet Take 1 tablet (5 mg total) by mouth once. 1 tablet 0    DULoxetine (CYMBALTA) 20 MG capsule Take 1 capsule (20 mg total) by mouth once daily. 30 capsule 2     No current facility-administered medications for this visit.        Past Medical History  Past Medical History:   Diagnosis Date    Hyperlipidemia     MALT lymphoma        Past Surgical History  Past Surgical History:   Procedure Laterality Date    APPENDECTOMY      CHOLECYSTECTOMY      HYSTERECTOMY      OOPHORECTOMY         Family History  Family History   Problem Relation Age of Onset    Breast cancer Maternal Grandmother     Ovarian cancer Neg Hx        Social History  Social History     Social History    Marital status:      Spouse name: N/A    Number of children: N/A    Years of education: N/A      Occupational History    Not on file.     Social History Main Topics    Smoking status: Never Smoker    Smokeless tobacco: Never Used    Alcohol use Yes      Comment: rarely     Drug use: No    Sexual activity: Yes     Partners: Male     Other Topics Concern    Not on file     Social History Narrative    No narrative on file        Review of Systems  Review of Systems   Constitutional: Negative for chills, fatigue and fever.   HENT: Negative for congestion.    Eyes: Positive for visual disturbance (intermittent blurring).   Respiratory: Negative for cough, shortness of breath and wheezing.    Cardiovascular: Negative for chest pain and palpitations.   Gastrointestinal: Negative for abdominal pain, constipation, diarrhea, nausea and vomiting.   Endocrine: Negative for cold intolerance and heat intolerance.   Genitourinary: Negative for difficulty urinating, dysuria and hematuria.   Musculoskeletal: Negative for arthralgias and myalgias.   Skin: Negative for rash and wound.   Allergic/Immunologic: Negative for immunocompromised state.   Neurological: Negative for dizziness, tremors, seizures, weakness, numbness and headaches.   Hematological: Does not bruise/bleed easily.   Psychiatric/Behavioral: Negative for dysphoric mood and sleep disturbance. The patient is not nervous/anxious.        Objective:        Vitals:    01/30/18 0807   BP: (!) 145/86   Pulse: 73   Resp: 17     Body mass index is 29.39 kg/m².  Neurologic Exam     Mental Status   Oriented to person, place, and time.   Registration: recalls 3 of 3 objects. Recall at 5 minutes: recalls 3 of 3 objects. Follows 3 step commands.   Attention: normal. Concentration: normal.   Speech: speech is normal   Able to name object. Able to repeat. Normal comprehension.   Complex objects reg/recall 3/3  Normal clock drawing  MMSE score 30/30     Cranial Nerves     CN II   Visual fields full to confrontation.     CN III, IV, VI   Pupils are equal, round, and  reactive to light.  Extraocular motions are normal.   CN III: no CN III palsy  CN VI: no CN VI palsy  Nystagmus: none   Ophthalmoparesis: none  Conjugate gaze: present    CN V   Facial sensation intact.     CN VII   Facial expression full, symmetric.     CN VIII   CN VIII normal.     CN IX, X   CN IX normal.   CN X normal.     CN XI   CN XI normal.     CN XII   CN XII normal.     Motor Exam   Muscle bulk: normal  Right arm tone: normal  Left arm tone: normal  Right arm pronator drift: absent  Left arm pronator drift: absent  Right leg tone: normal  Left leg tone: normal    Strength   Strength 5/5 throughout.     Sensory Exam   Light touch normal.   Vibration normal.   Proprioception normal.   Pinprick normal.   Sensory deficit distribution on right: median  Slight decrease to pin and temp R hand, sparing 5th digit and thenar eminence    Increased sensitivity and paresthesias to b/l occipital scalp, negative occipital Tinel's     Gait, Coordination, and Reflexes     Gait  Gait: normal    Coordination   Romberg: negative    Tremor   Resting tremor: absent  Intention tremor: absent  Action tremor: absent    Reflexes   Right brachioradialis: 2+  Left brachioradialis: 2+  Right biceps: 2+  Left biceps: 2+  Right triceps: 2+  Left triceps: 2+  Right patellar: 2+  Left patellar: 2+  Right achilles: 2+  Left achilles: 2+  Right plantar: normal  Left plantar: normal      Physical Exam   Constitutional: She is oriented to person, place, and time.   Eyes: EOM are normal. Pupils are equal, round, and reactive to light.   Neurological: She is oriented to person, place, and time. She has normal strength. She has a normal Romberg Test. Gait normal.   Reflex Scores:       Tricep reflexes are 2+ on the right side and 2+ on the left side.       Bicep reflexes are 2+ on the right side and 2+ on the left side.       Brachioradialis reflexes are 2+ on the right side and 2+ on the left side.       Patellar reflexes are 2+ on the right  side and 2+ on the left side.       Achilles reflexes are 2+ on the right side and 2+ on the left side.  Psychiatric: Her speech is normal.         Data Review:     Ct Head Wo Contrast    Result Date: 10/18/2017  REASON FOR EXAM: Fall and struck back of head on floor TECHNICAL FACTORS: 5 mm contiguous axial CT images were obtained from the foramen magnum to the skull vertex. COMPARISON: None FINDINGS: Minimal, generalized deepening of cortical sulci is present. The ventricular system appears unremarkable. No white or gray matter focal abnormality is identified. The white-gray matter interface is preserved. No intracranial hemorrhage is identified. The calvarium is intact. Middle ear and mastoid areas are aerated. Paranasal sinuses are aerated. Orbital contents appear unremarkable.     1. No acute or focal finding. 2. Minimally diminished cerebral volume. Electronically signed by Edouard Demarco MD on 10/18/2017 11:02 PM           Lab values from her PCPs office June 2017 included blood pressure 144/81, ALT 65, AST 44, normal BUN and creatinine, electrolytes, glucose.  TSH 4.75, T4 8 0.4, free T4 1 0.0, total cholesterol 226, HDL 47, .  Normal CBC.  CK 39.  Aldolase 6.2.    Seen by Dr. May in nephrology for microscopic hematuria.  Lab studies included normal glucose of 88, normal metabolic panel and renal function, urinalysis with 1+ occult blood and otherwise normal.    Assessment:       1. Cervicogenic headache    2. Bilateral occipital neuralgia        Plan:         Problem List Items Addressed This Visit     None      Visit Diagnoses     Cervicogenic headache    -  Primary    Relevant Medications    DULoxetine (CYMBALTA) 20 MG capsule    Other Relevant Orders    Ambulatory Referral to Physical/Occupational Therapy    Bilateral occipital neuralgia        Relevant Medications    DULoxetine (CYMBALTA) 20 MG capsule    Other Relevant Orders    Ambulatory Referral to Physical/Occupational Therapy           Follow-up if symptoms worsen or fail to improve.        Patient information shared at the time of visit:  I don't think you have had any significant injury to the brain from this - the issues is more related to irritation of the nerves in the scalp (occipital nerves) and muscles in the back of the neck.     The muscle relaxer can help, and I would like you to start taking a medicine called Cymbalta 20 mg one pill a day in the mornings.  It is an antidepressant also, but our reason for using this medicine is that it is relatively mild but can be helpful in nerve irritation or nerve pain.     Thank you very much for the opportunity to assist in this patient's care.  If you have any questions or concerns, please do not hesitate to contact me at any time.     Sincerely,  Moses Stern, DO

## 2018-01-30 NOTE — PATIENT INSTRUCTIONS
I don't think you have had any significant injury to the brain from this - the issues is more related to irritation of the nerves in the scalp (occipital nerves) and muscles in the back of the neck.     The muscle relaxer can help, and I would like you to start taking a medicine called Cymbalta 20 mg one pill a day in the mornings.  It is an antidepressant also, but our reason for using this medicine is that it is relatively mild but can be helpful in nerve irritation or nerve pain.

## 2018-01-30 NOTE — LETTER
February 5, 2018      Alton Hazel MD  [address] [address]           [facility]  [address] [facility] [place] [address]  Phone: [phone]  Fax: [phone]          Patient: Brenna Gaytan   MR Number: [id number]   YOB: 1960   Date of Visit: 1/30/2018       Dear Dr. Alton Hazel:    Thank you for referring Brenna Gaytan to me for evaluation. Attached you will find relevant portions of my assessment and plan of care.    If you have questions, please do not hesitate to call me. I look forward to following Brenna Gaytan along with you.    Sincerely,    Moses Stern, DO    Enclosure  CC:  No Recipients    If you would like to receive this communication electronically, please contact [email or web address] or [phone] to request more information on [employer] Link access.    For providers and/or their staff who would like to refer a patient to [facility], please contact us through our one-stop-shop provider referral line, [facility] , at [phone].    If you feel you have received this communication in error or would no longer like to receive these types of communications, please e-mail [email or web address]

## 2018-02-28 ENCOUNTER — TELEPHONE (OUTPATIENT)
Dept: NEUROLOGY | Facility: CLINIC | Age: 58
End: 2018-02-28

## 2018-02-28 NOTE — TELEPHONE ENCOUNTER
----- Message from Sam Lozada sent at 2/28/2018 12:15 PM CST -----  Contact: Patient  Brenna, [phone], calling to cancel request for physical therapy orders being sent to a different doctor. She has a copy that she will bring in herself. Please advise. Thanks.

## 2018-02-28 NOTE — TELEPHONE ENCOUNTER
----- Message from RT Vikki sent at 2/28/2018 10:56 AM CST -----  Contact: pt    pt , requesting if your office can fax her PT orders to another facility: [facility] at , thanks.

## 2018-04-30 ENCOUNTER — TELEPHONE (OUTPATIENT)
Dept: NEUROLOGY | Facility: CLINIC | Age: 58
End: 2018-04-30

## 2018-04-30 NOTE — TELEPHONE ENCOUNTER
Left message for patient to return my call to let her know that she already has an f/u appointment. She has an appointment scheduled on 5/22/18 at 9:20 am to see Dr. Stern.

## 2018-05-22 ENCOUNTER — OFFICE VISIT (OUTPATIENT)
Dept: NEUROLOGY | Facility: CLINIC | Age: 58
End: 2018-05-22
Payer: COMMERCIAL

## 2018-05-22 VITALS
HEIGHT: 59 IN | BODY MASS INDEX: 29.4 KG/M2 | WEIGHT: 145.81 LBS | SYSTOLIC BLOOD PRESSURE: 143 MMHG | RESPIRATION RATE: 20 BRPM | DIASTOLIC BLOOD PRESSURE: 82 MMHG | HEART RATE: 72 BPM

## 2018-05-22 DIAGNOSIS — G44.86 CERVICOGENIC HEADACHE: Primary | ICD-10-CM

## 2018-05-22 PROCEDURE — 99213 OFFICE O/P EST LOW 20 MIN: CPT | Mod: S$GLB,,, | Performed by: PSYCHIATRY & NEUROLOGY

## 2018-05-22 PROCEDURE — 99999 PR PBB SHADOW E&M-EST. PATIENT-LVL III: CPT | Mod: PBBFAC,,, | Performed by: PSYCHIATRY & NEUROLOGY

## 2018-05-22 PROCEDURE — 3008F BODY MASS INDEX DOCD: CPT | Mod: CPTII,S$GLB,, | Performed by: PSYCHIATRY & NEUROLOGY

## 2018-05-22 NOTE — PROGRESS NOTES
Subjective:          Patient ID: Brenna Gaytan is a 57 y.o.  female who presents for follow up of cervicogenic HA and occipital neuralgia    Initial / Last History of Present Illness 1/30/18:    Reviewed records from Dr. Alton Hazel's office Oct 26, 2017.  Patient reported having had a fall, head hit her head and had hypertensive blood pressure readings.  Seen in emergency room, CT scan of the head was performed which was reportedly negative.  Positive history of nausea, blurry vision and headache.  Was prescribed methocarbamol and diclofenac.     Reviewed records and care everywhere.  October 18, 2017 she reported to the emergency room at [facility] after a slip and fall in which she struck her head on the floor at a [employer].  Denied loss of consciousness.  Positive nausea, dizziness, hazy feeling.  Denied blurred vision, diplopia, weakness, numbness, fever, congestion, cough, or vomiting.  Her blood pressure was 175/89.  She has a 4 cm contusion to the occipital region on her scalp with tenderness to palpation.  Her neurological examination was normal.  CT scan of the head significant for minimal, generalized deepening of the cortical sulci, no acute abnormalities.     She recalls being in the store, slipped on a clothes  on the floor, tried to grab something to break her fall, hit back of her head.      Her concerns since that time, she states she may forget where she is going or where she is when driving; may feel like she is swaying or might pass out but has not had any syncope.  Vision may be blurry from time to time.  Constant achyness, tenderness in the occipital region.  MM relaxer does help some, takes at night for fear of side effects.     Lives independently,  often is out of town on work.  She manages the finances at home, shopping and cooking, independent in all aspects of living.       Feeling much more anxiety since the episode.       No prior hx of concussion, meningitis, stroke or  seizure.     She is seeing a chiropractor.  Had seen a neurologist near [facility] prior to this visit on two occasions. Does not recall his name.  Was dx with post-concussion syndrome?     Interval history 5/22/18:    Overall doing well.  She has had some stressors - potential move, selling house, but no significant headaches.  On occasion may get a mild headache, may take some ibuprofen or tylenol which helps.  Overall feeling better, more energetic. Does not slow her down or have to rest.      Doing PT exercises at home.     Review of patient's allergies indicates:  No Known Allergies  Current Outpatient Prescriptions   Medication Sig Dispense Refill    coenzyme Q10 (CO Q-10) 200 mg capsule Take 200 mg by mouth once daily.      colestipol (COLESTID) 1 gram Tab Take 2 g by mouth once daily.   10    DOCOSAHEXANOIC ACID/EPA (FISH OIL ORAL) Take 2 capsules by mouth once daily.      DULoxetine (CYMBALTA) 20 MG capsule Take 1 capsule (20 mg total) by mouth once daily. 30 capsule 2    EEMT HS 0.625-1.25 mg per tablet TAKE 1 TABLET BY MOUTH ONCE DAILY 30 tablet 3    fluticasone (FLONASE) 50 mcg/actuation nasal spray 2 sprays by Each Nare route once daily.  0    ibuprofen (ADVIL,MOTRIN) 800 MG tablet TK 1 T PO Q 6 H PRN P  0    methocarbamol (ROBAXIN) 500 MG Tab Take 500 mg by mouth 3 (three) times daily.  0    multivitamin (ONE DAILY MULTIVITAMIN) per tablet Take 1 tablet by mouth once daily.      omeprazole (PRILOSEC OTC) 20 MG tablet Take 20 mg by mouth once daily.      diazePAM (VALIUM) 5 MG tablet Take 1 tablet (5 mg total) by mouth once. 1 tablet 0    oxybutynin (DITROPAN-XL) 5 MG TR24 Take 1 tablet (5 mg total) by mouth once daily. 30 tablet 11     No current facility-administered medications for this visit.          Review of Systems  Review of Systems    Objective:        Vitals:    05/22/18 0924   BP: (!) 143/82   Pulse: 72   Resp: 20     Body mass index is 29.45 kg/m².  Neurologic Exam      Mental Status   Oriented to person, place, and time.   Follows 3 step commands.   Attention: normal. Concentration: normal.   Speech: speech is normal   Level of consciousness: alert  Knowledge: good.   Able to name object. Able to repeat. Normal comprehension.     Cranial Nerves     CN II   Visual fields full to confrontation.     CN III, IV, VI   Pupils are equal, round, and reactive to light.  Extraocular motions are normal.   CN III: no CN III palsy  CN VI: no CN VI palsy  Nystagmus: none     CN V   Facial sensation intact.     CN VII   Facial expression full, symmetric.     Motor Exam   Muscle bulk: normal  Right arm pronator drift: absent  Left arm pronator drift: absent    Strength   Strength 5/5 throughout.       Physical Exam   Constitutional: She is oriented to person, place, and time.   Eyes: EOM are normal. Pupils are equal, round, and reactive to light.   Neurological: She is oriented to person, place, and time. She has normal strength.   Psychiatric: Her speech is normal.         Data Review:    Assessment:        1. Cervicogenic headache           Plan:         Problem List Items Addressed This Visit        Neuro    Cervicogenic headache - Primary    Current Assessment & Plan     Significantly improved.  No long-term sequela of head injury, concussion, no sign of postconcussive syndrome.  Discussed stress management techniques, continue with exercises she learned at physical therapy at home.  PRN ibuprofen or tylenol.  Nor further neurological studies indicated at this time.                Follow-up if symptoms worsen or fail to improve.       Thank you very much for the opportunity to assist in this patient's care.  If you have any questions or concerns, please do not hesitate to contact me at any time.     Sincerely,  Moses Stern, DO

## 2018-05-22 NOTE — ASSESSMENT & PLAN NOTE
Significantly improved.  No long-term sequela of head injury, concussion, no sign of postconcussive syndrome.  Discussed stress management techniques, continue with exercises she learned at physical therapy at home.  PRN ibuprofen or tylenol.  Nor further neurological studies indicated at this time.

## 2018-05-24 DIAGNOSIS — Z12.31 SCREENING MAMMOGRAM, ENCOUNTER FOR: Primary | ICD-10-CM

## 2018-05-28 DIAGNOSIS — R31.9 HEMATURIA: ICD-10-CM

## 2018-05-28 RX ORDER — ESTERIFIED ESTROGEN AND METHYLTESTOSTERONE .625; 1.25 MG/1; MG/1
1 TABLET ORAL DAILY
Qty: 30 TABLET | Refills: 0 | OUTPATIENT
Start: 2018-05-28 | End: 2019-05-28

## 2018-05-29 ENCOUNTER — OFFICE VISIT (OUTPATIENT)
Dept: OBSTETRICS AND GYNECOLOGY | Facility: CLINIC | Age: 58
End: 2018-05-29
Payer: COMMERCIAL

## 2018-05-29 ENCOUNTER — HOSPITAL ENCOUNTER (OUTPATIENT)
Dept: RADIOLOGY | Facility: HOSPITAL | Age: 58
Discharge: HOME | End: 2018-05-29
Attending: OBSTETRICS & GYNECOLOGY
Payer: COMMERCIAL

## 2018-05-29 VITALS
WEIGHT: 144.81 LBS | BODY MASS INDEX: 29.19 KG/M2 | WEIGHT: 144 LBS | DIASTOLIC BLOOD PRESSURE: 60 MMHG | HEIGHT: 59 IN | SYSTOLIC BLOOD PRESSURE: 110 MMHG | BODY MASS INDEX: 29.03 KG/M2 | HEIGHT: 59 IN

## 2018-05-29 DIAGNOSIS — Z12.31 SCREENING MAMMOGRAM, ENCOUNTER FOR: ICD-10-CM

## 2018-05-29 DIAGNOSIS — Z01.419 VISIT FOR GYNECOLOGIC EXAMINATION: Primary | ICD-10-CM

## 2018-05-29 PROCEDURE — 77067 SCR MAMMO BI INCL CAD: CPT | Mod: TC,PN

## 2018-05-29 PROCEDURE — 77067 SCR MAMMO BI INCL CAD: CPT | Mod: 26,,, | Performed by: RADIOLOGY

## 2018-05-29 PROCEDURE — 99396 PREV VISIT EST AGE 40-64: CPT | Mod: S$GLB,,, | Performed by: OBSTETRICS & GYNECOLOGY

## 2018-05-29 PROCEDURE — 99999 PR PBB SHADOW E&M-EST. PATIENT-LVL III: CPT | Mod: PBBFAC,,, | Performed by: OBSTETRICS & GYNECOLOGY

## 2018-05-29 PROCEDURE — 77063 BREAST TOMOSYNTHESIS BI: CPT | Mod: 26,,, | Performed by: RADIOLOGY

## 2018-05-29 RX ORDER — OXYBUTYNIN CHLORIDE 5 MG/1
TABLET, EXTENDED RELEASE ORAL
Qty: 30 TABLET | Refills: 0 | Status: SHIPPED | OUTPATIENT
Start: 2018-05-29 | End: 2018-06-25 | Stop reason: SDUPTHER

## 2018-05-29 RX ORDER — ESTRADIOL 1 MG/1
1 TABLET ORAL DAILY
Qty: 90 TABLET | Refills: 3 | Status: SHIPPED | OUTPATIENT
Start: 2018-05-29 | End: 2019-04-26 | Stop reason: SDUPTHER

## 2018-05-29 NOTE — PROGRESS NOTES
Chief Complaint   Patient presents with    Well Woman       History and Physical:  No LMP recorded. Patient has had a hysterectomy.       Brenna Gaytan is a 57 y.o.   female who presents today for her routine annual GYN exam. The patient has no Gynecology complaints today. Doing well on estratest, requst generic, s/p hysterectomy with BSO      Allergies: Review of patient's allergies indicates:  No Known Allergies    Past Medical History:   Diagnosis Date    Hyperlipidemia     MALT lymphoma        Past Surgical History:   Procedure Laterality Date    APPENDECTOMY      CHOLECYSTECTOMY      HYSTERECTOMY      OOPHORECTOMY         MEDS:   Current Outpatient Prescriptions on File Prior to Visit   Medication Sig Dispense Refill    coenzyme Q10 (CO Q-10) 200 mg capsule Take 200 mg by mouth once daily.      colestipol (COLESTID) 1 gram Tab Take 2 g by mouth once daily.   10    DOCOSAHEXANOIC ACID/EPA (FISH OIL ORAL) Take 2 capsules by mouth once daily.      multivitamin (ONE DAILY MULTIVITAMIN) per tablet Take 1 tablet by mouth once daily.      omeprazole (PRILOSEC OTC) 20 MG tablet Take 20 mg by mouth once daily.      oxybutynin (DITROPAN-XL) 5 MG TR24 TAKE 1 TABLET(5 MG) BY MOUTH EVERY DAY 30 tablet 0    [DISCONTINUED] EEMT HS 0.625-1.25 mg per tablet TAKE 1 TABLET BY MOUTH ONCE DAILY 30 tablet 3    diazePAM (VALIUM) 5 MG tablet Take 1 tablet (5 mg total) by mouth once. 1 tablet 0    fluticasone (FLONASE) 50 mcg/actuation nasal spray 2 sprays by Each Nare route once daily.  0    ibuprofen (ADVIL,MOTRIN) 800 MG tablet TK 1 T PO Q 6 H PRN P  0    methocarbamol (ROBAXIN) 500 MG Tab Take 500 mg by mouth 3 (three) times daily.  0    [DISCONTINUED] DULoxetine (CYMBALTA) 20 MG capsule Take 1 capsule (20 mg total) by mouth once daily. 30 capsule 2     No current facility-administered medications on file prior to visit.        OB History      Para Term  AB Living    1 1 1     1    SAB TAB  "Ectopic Multiple Live Births                       Social History     Social History    Marital status:      Spouse name: N/A    Number of children: N/A    Years of education: N/A     Occupational History    Not on file.     Social History Main Topics    Smoking status: Never Smoker    Smokeless tobacco: Never Used    Alcohol use Yes      Comment: rarely     Drug use: No    Sexual activity: Yes     Partners: Male     Other Topics Concern    Not on file     Social History Narrative    No narrative on file       Family History   Problem Relation Age of Onset    Breast cancer Maternal Grandmother     Ovarian cancer Neg Hx          Past medical and surgical history reviewed.   I have reviewed the patient's medical history in detail and updated the computerized patient record.        Review of System:   General: no chills, fever, night sweats, weight gain or weight loss  Psychological: no depression or suicidal ideation  Breasts: no new or changing breast lumps, nipple discharge or masses.  Respiratory: no cough, shortness of breath, or wheezing  Cardiovascular: no chest pain or dyspnea on exertion  Gastrointestinal: no abdominal pain, change in bowel habits, or black or bloody stools  Genito-Urinary: no incontinence, urinary frequency/urgency or vulvar/vaginal symptoms, pelvic pain or abnormal vaginal bleeding.  Musculoskeletal: no gait disturbance or muscular weakness      Physical Exam:   /60   Ht 4' 11" (1.499 m)   Wt 65.7 kg (144 lb 13.5 oz)   BMI 29.25 kg/m²   Constitutional: She is oriented to person, place, and time. She appears well-developed and well-nourished. No distress.   HENT:   Head: Normocephalic and atraumatic.   Eyes: Conjunctivae and EOM are normal. No scleral icterus.   Neck: Normal range of motion. Neck supple. No tracheal deviation present.   Cardiovascular: Normal rate.    Pulmonary/Chest: Effort normal. No respiratory distress. She exhibits no tenderness.  Breasts: " are symmetrical.   Right breast exhibits no inverted nipple, no mass, no nipple discharge, no skin change and no tenderness.   Left breast exhibits no inverted nipple, no mass, no nipple discharge, no skin change and no tenderness.  Abdominal: Soft. She exhibits no distension and no mass. There is no tenderness. There is no rebound and no guarding.   Genitourinary: deferred, seeing urology, no complaints, s/p hysterectomy w BSO  Musculoskeletal: Normal range of motion.   Lymphadenopathy: No inguinal adenopathy present.   Neurological: She is alert and oriented to person, place, and time. Coordination normal.   Skin: Skin is warm and dry. She is not diaphoretic.   Psychiatric: She has a normal mood and affect.        Assessment:   Normal annual GYN exam  Normal breast exam doing well on Hormone Replacement Therapy , request generic w/o testosterone    Plan:   PAP not needed  Mammogram  Follow up in 1 year.

## 2018-06-25 DIAGNOSIS — R31.9 HEMATURIA: ICD-10-CM

## 2018-06-25 RX ORDER — OXYBUTYNIN CHLORIDE 5 MG/1
TABLET, EXTENDED RELEASE ORAL
Qty: 30 TABLET | Refills: 11 | Status: SHIPPED | OUTPATIENT
Start: 2018-06-25

## 2018-06-29 ENCOUNTER — OFFICE VISIT (OUTPATIENT)
Dept: UROLOGY | Facility: CLINIC | Age: 58
End: 2018-06-29
Payer: COMMERCIAL

## 2018-06-29 VITALS
HEART RATE: 104 BPM | BODY MASS INDEX: 29.91 KG/M2 | DIASTOLIC BLOOD PRESSURE: 88 MMHG | HEIGHT: 59 IN | SYSTOLIC BLOOD PRESSURE: 127 MMHG | WEIGHT: 148.38 LBS

## 2018-06-29 DIAGNOSIS — R31.29 HEMATURIA, MICROSCOPIC: ICD-10-CM

## 2018-06-29 DIAGNOSIS — R35.0 FREQUENCY OF MICTURITION: Primary | ICD-10-CM

## 2018-06-29 LAB
BILIRUB SERPL-MCNC: NORMAL MG/DL
BILIRUB SERPL-MCNC: NORMAL MG/DL
BLOOD URINE, POC: NORMAL
BLOOD URINE, POC: NORMAL
COLOR, POC UA: YELLOW
COLOR, POC UA: YELLOW
GLUCOSE UR QL STRIP: NORMAL
GLUCOSE UR QL STRIP: NORMAL
KETONES UR QL STRIP: NORMAL
KETONES UR QL STRIP: NORMAL
LEUKOCYTE ESTERASE URINE, POC: NORMAL
LEUKOCYTE ESTERASE URINE, POC: NORMAL
NITRITE, POC UA: NORMAL
NITRITE, POC UA: NORMAL
PH, POC UA: 5.5
PH, POC UA: 6
PROTEIN, POC: NORMAL
PROTEIN, POC: NORMAL
SPECIFIC GRAVITY, POC UA: 1.02
SPECIFIC GRAVITY, POC UA: 1.02
UROBILINOGEN, POC UA: NORMAL
UROBILINOGEN, POC UA: NORMAL

## 2018-06-29 PROCEDURE — 99214 OFFICE O/P EST MOD 30 MIN: CPT | Mod: 25,S$GLB,, | Performed by: UROLOGY

## 2018-06-29 PROCEDURE — 87086 URINE CULTURE/COLONY COUNT: CPT

## 2018-06-29 PROCEDURE — 81002 URINALYSIS NONAUTO W/O SCOPE: CPT | Mod: S$GLB,,, | Performed by: UROLOGY

## 2018-06-29 PROCEDURE — 99999 PR PBB SHADOW E&M-EST. PATIENT-LVL III: CPT | Mod: PBBFAC,,, | Performed by: UROLOGY

## 2018-06-29 PROCEDURE — 3008F BODY MASS INDEX DOCD: CPT | Mod: CPTII,S$GLB,, | Performed by: UROLOGY

## 2018-06-29 RX ORDER — OXYBUTYNIN CHLORIDE 10 MG/1
10 TABLET, EXTENDED RELEASE ORAL DAILY
Qty: 30 TABLET | Refills: 11 | Status: SHIPPED | OUTPATIENT
Start: 2018-06-29 | End: 2019-07-25 | Stop reason: SDUPTHER

## 2018-06-29 NOTE — PROGRESS NOTES
Time: 3:00 pm    Order reviewed and verified Yes    Verified correct patient using two patient identifiers:: Yes    Allergies verified: Yes    Hand hygiene performed Yes    Name of 2nd person for insertion if applicable n/a    Insertion attempts (insert comment if >2 attempts): 1    Catheter type: rigid    Tube size: 14 fr    Catheter inserted using sterile technique: Yes    Urine returned:Yesclear    Urine specimen collected: Yes    Specimen labeled and verified in front of patient Yes    Total urine volume obtained: 20 ml    Patient reports pain level as 0    Dr. Souza notified

## 2018-06-29 NOTE — PROGRESS NOTES
"Subjective:       Patient ID: Brenna Gaytan is a 57 y.o. female.    Chief Complaint: Follow-up    HPI     57 year old with a history of urinary frequency and microhematuria.  She was evaluated last year.  Renal US and cystoscopy were normal.  She was also evaluated by nephrology and work-up negative.  She was given oxybutynin 5 mg ER last year and she had improvement in her frequency but in the last 2 weeks she feels that her frequency has gotten worse.  She has double voiding and sensation of incomplete emptying.  She denies gross hematuria and dysuria.  She has "slight discomfort" in her back and abdomen.  Cath:  Urine dipstick shows positive for 2+blood.  Micro exam: 5-10 RBC's per HPF.  Few squamous cells despite cath specimen  PVR on Cath 20 ml      Current Outpatient Prescriptions:     coenzyme Q10 (CO Q-10) 200 mg capsule, Take 200 mg by mouth once daily., Disp: , Rfl:     colestipol (COLESTID) 1 gram Tab, Take 2 g by mouth once daily. , Disp: , Rfl: 10    DOCOSAHEXANOIC ACID/EPA (FISH OIL ORAL), Take 2 capsules by mouth once daily., Disp: , Rfl:     estradiol (ESTRACE) 1 MG tablet, Take 1 tablet (1 mg total) by mouth once daily., Disp: 90 tablet, Rfl: 3    fluticasone (FLONASE) 50 mcg/actuation nasal spray, 2 sprays by Each Nare route daily as needed. , Disp: , Rfl: 0    ibuprofen (ADVIL,MOTRIN) 800 MG tablet, TK 1 T PO Q 6 H PRN P, Disp: , Rfl: 0    methocarbamol (ROBAXIN) 500 MG Tab, Take 500 mg by mouth 3 (three) times daily as needed. , Disp: , Rfl: 0    multivitamin (ONE DAILY MULTIVITAMIN) per tablet, Take 1 tablet by mouth once daily., Disp: , Rfl:     omeprazole (PRILOSEC OTC) 20 MG tablet, Take 20 mg by mouth once daily., Disp: , Rfl:     oxybutynin (DITROPAN-XL) 5 MG TR24, TAKE 1 TABLET(5 MG) BY MOUTH EVERY DAY, Disp: 30 tablet, Rfl: 11    oxybutynin (DITROPAN-XL) 10 MG 24 hr tablet, Take 1 tablet (10 mg total) by mouth once daily., Disp: 30 tablet, Rfl: 11      Review of Systems "   Constitutional: Negative for fever.   Genitourinary: Positive for frequency. Negative for dysuria and hematuria.       Objective:      Physical Exam   Constitutional: She is oriented to person, place, and time. She appears well-developed and well-nourished.   HENT:   Head: Normocephalic.   Eyes: Conjunctivae and EOM are normal.   Neck: Normal range of motion.   Cardiovascular: Normal rate.    Pulmonary/Chest: Effort normal.   Abdominal: Soft. She exhibits no distension and no mass. There is no tenderness.   Genitourinary:   Genitourinary Comments: Bladder non-tender and nondistended  No CVA tenderness   Musculoskeletal: She exhibits no edema.   Neurological: She is alert and oriented to person, place, and time.   Skin: Skin is warm and dry. No rash noted. No erythema.   Psychiatric: She has a normal mood and affect. Her behavior is normal.   Vitals reviewed.      Assessment:       1. Frequency of micturition    2. Hematuria, microscopic        Plan:       Frequency of micturition  -     POCT URINE DIPSTICK WITHOUT MICROSCOPE  -     POCT URINE DIPSTICK WITHOUT MICROSCOPE  -     Urine culture    Hematuria, microscopic    Other orders  -     oxybutynin (DITROPAN-XL) 10 MG 24 hr tablet; Take 1 tablet (10 mg total) by mouth once daily.  Dispense: 30 tablet; Refill: 11      Increase dose of oxybutynin to 10 mg.  F/u culture

## 2018-06-30 LAB — BACTERIA UR CULT: NO GROWTH

## 2018-07-03 ENCOUNTER — TELEPHONE (OUTPATIENT)
Dept: UROLOGY | Facility: CLINIC | Age: 58
End: 2018-07-03

## 2018-07-03 NOTE — TELEPHONE ENCOUNTER
Spoke to pt and informed her of the urine culture results. She sates she has some burning that started today. I advise that she can take AZO that is OTC for the bunring. Pt states she has take that in the past. She kept asking if dr butler was going to prescribe anything for her. The culture was no growth, so he will not be putting the pt on an antibiotic. If she wanted to bring in another urine sample, even though we just checked one, she could, pt declined. Pt hung up the phone up on me.

## 2018-07-03 NOTE — TELEPHONE ENCOUNTER
----- Message from Rosemary Larkin sent at 7/2/2018  4:38 PM CDT -----  Contact: Self  Type:  Patient Returning Call    Who Called:  Patient   Who Left Message for Patient:  Naomy  Does the patient know what this is regarding?:  yes  Best Call Back Number:  [phone]      Additional Information: Lab Results

## 2018-11-02 ENCOUNTER — TELEPHONE (OUTPATIENT)
Dept: UROLOGY | Facility: CLINIC | Age: 58
End: 2018-11-02

## 2018-11-02 NOTE — TELEPHONE ENCOUNTER
----- Message from Angela Jeter sent at 11/2/2018  8:38 AM CDT -----  Contact: pt  Pt would like to be called back regarding getting a appt -     Pt can be reached at [phone]

## 2018-12-10 ENCOUNTER — OFFICE VISIT (OUTPATIENT)
Dept: UROLOGY | Facility: CLINIC | Age: 58
End: 2018-12-10
Payer: COMMERCIAL

## 2018-12-10 VITALS
HEIGHT: 59 IN | BODY MASS INDEX: 29.64 KG/M2 | HEART RATE: 78 BPM | DIASTOLIC BLOOD PRESSURE: 81 MMHG | SYSTOLIC BLOOD PRESSURE: 134 MMHG | WEIGHT: 147 LBS

## 2018-12-10 DIAGNOSIS — R30.0 DYSURIA: ICD-10-CM

## 2018-12-10 DIAGNOSIS — R31.29 MICROHEMATURIA: Primary | ICD-10-CM

## 2018-12-10 LAB
BILIRUB SERPL-MCNC: ABNORMAL MG/DL
BLOOD URINE, POC: ABNORMAL
COLOR, POC UA: ABNORMAL
GLUCOSE UR QL STRIP: ABNORMAL
KETONES UR QL STRIP: ABNORMAL
LEUKOCYTE ESTERASE URINE, POC: ABNORMAL
NITRITE, POC UA: ABNORMAL
PH, POC UA: 5.5
PROTEIN, POC: ABNORMAL
SPECIFIC GRAVITY, POC UA: 1030
UROBILINOGEN, POC UA: 0.2

## 2018-12-10 PROCEDURE — 81002 URINALYSIS NONAUTO W/O SCOPE: CPT | Mod: S$GLB,,, | Performed by: UROLOGY

## 2018-12-10 PROCEDURE — 3008F BODY MASS INDEX DOCD: CPT | Mod: CPTII,S$GLB,, | Performed by: UROLOGY

## 2018-12-10 PROCEDURE — 87086 URINE CULTURE/COLONY COUNT: CPT

## 2018-12-10 PROCEDURE — 99999 PR PBB SHADOW E&M-EST. PATIENT-LVL III: CPT | Mod: PBBFAC,,, | Performed by: UROLOGY

## 2018-12-10 PROCEDURE — 99213 OFFICE O/P EST LOW 20 MIN: CPT | Mod: 25,S$GLB,, | Performed by: UROLOGY

## 2018-12-10 RX ORDER — PHENAZOPYRIDINE HYDROCHLORIDE 100 MG/1
100 TABLET, FILM COATED ORAL 3 TIMES DAILY PRN
Qty: 30 TABLET | Refills: 11 | Status: SHIPPED | OUTPATIENT
Start: 2018-12-10 | End: 2018-12-20

## 2018-12-10 RX ORDER — CLOTRIMAZOLE AND BETAMETHASONE DIPROPIONATE 10; .64 MG/G; MG/G
CREAM TOPICAL 2 TIMES DAILY
Qty: 45 G | Refills: 2 | Status: SHIPPED | OUTPATIENT
Start: 2018-12-10

## 2018-12-10 RX ORDER — DICLOFENAC SODIUM 75 MG/1
75 TABLET, DELAYED RELEASE ORAL
COMMUNITY
Start: 2017-10-18

## 2018-12-10 NOTE — PROGRESS NOTES
Subjective:       Patient ID: Brenna Gaytan is a 58 y.o. female.    Chief Complaint: Follow-up    HPI     58 year old with a history of urinary frequency and microhematuria.  She was evaluated in the past with renal US and cystoscopy.  She was also evaluated by nephrology and work-up negative.  She is taking oxybutynin 10 mg ER and has improvement in her frequency and urgency.  She complains of burning with urination.  The burning is on the outside.  AZO is ineffective.  She has tried topical creams and this is ineffective.     Urine dipstick shows negative for nitrites, leukocytes, glucose, protein, positive for 2+ blood      Review of Systems   Constitutional: Negative for fever.   Genitourinary: Negative for dysuria and hematuria.       Objective:      Physical Exam   Constitutional: She is oriented to person, place, and time. She appears well-developed and well-nourished.   Pulmonary/Chest: Effort normal. No respiratory distress.   Neurological: She is alert and oriented to person, place, and time.   Skin: Skin is warm.   Psychiatric: She has a normal mood and affect. Her behavior is normal.   Vitals reviewed.      Assessment:       1. Microhematuria    2. Dysuria        Plan:       Microhematuria  -     POCT URINE DIPSTICK WITHOUT MICROSCOPE    Dysuria  -     Urine culture    Other orders  -     phenazopyridine (PYRIDIUM) 100 MG tablet; Take 1 tablet (100 mg total) by mouth 3 (three) times daily as needed for Pain (painful urination).  Dispense: 30 tablet; Refill: 11  -     clotrimazole-betamethasone 1-0.05% (LOTRISONE) cream; Apply topically 2 (two) times daily.  Dispense: 45 g; Refill: 2      Continue oxybutynin

## 2018-12-12 LAB
BACTERIA UR CULT: NORMAL
BACTERIA UR CULT: NORMAL

## 2018-12-21 ENCOUNTER — TELEPHONE (OUTPATIENT)
Dept: OBSTETRICS AND GYNECOLOGY | Facility: CLINIC | Age: 58
End: 2018-12-21

## 2018-12-21 NOTE — TELEPHONE ENCOUNTER
----- Message from Lenore Jaquez sent at 12/21/2018 12:01 PM CST -----  Contact: Pt  Pt is calling to get medication ordered says she seen her Urologist and was given medication but it does not work    Says she thinks she has yeast and need to know if she can get some type of medication to get the irritation and burning relieved    Pt can be reached at [phone] thanks

## 2019-04-26 RX ORDER — ESTRADIOL 1 MG/1
TABLET ORAL
Qty: 30 TABLET | Refills: 0 | Status: SHIPPED | OUTPATIENT
Start: 2019-04-26 | End: 2019-05-26 | Stop reason: SDUPTHER

## 2019-04-26 RX ORDER — ESTRADIOL 1 MG/1
TABLET ORAL
Qty: 90 TABLET | Refills: 0 | OUTPATIENT
Start: 2019-04-26

## 2019-05-27 RX ORDER — ESTRADIOL 1 MG/1
TABLET ORAL
Qty: 30 TABLET | Refills: 0 | Status: SHIPPED | OUTPATIENT
Start: 2019-05-27 | End: 2019-06-25 | Stop reason: SDUPTHER

## 2019-06-25 RX ORDER — ESTRADIOL 1 MG/1
TABLET ORAL
Qty: 30 TABLET | Refills: 0 | Status: SHIPPED | OUTPATIENT
Start: 2019-06-25 | End: 2020-03-23

## 2019-07-25 RX ORDER — OXYBUTYNIN CHLORIDE 10 MG/1
TABLET, EXTENDED RELEASE ORAL
Qty: 30 TABLET | Refills: 11 | Status: SHIPPED | OUTPATIENT
Start: 2019-07-25

## 2019-11-12 ENCOUNTER — OFFICE VISIT (OUTPATIENT)
Dept: OBSTETRICS AND GYNECOLOGY | Facility: CLINIC | Age: 59
End: 2019-11-12
Payer: COMMERCIAL

## 2019-11-12 VITALS
SYSTOLIC BLOOD PRESSURE: 136 MMHG | DIASTOLIC BLOOD PRESSURE: 78 MMHG | BODY MASS INDEX: 29.97 KG/M2 | WEIGHT: 148.38 LBS

## 2019-11-12 DIAGNOSIS — N76.0 BV (BACTERIAL VAGINOSIS): Primary | ICD-10-CM

## 2019-11-12 DIAGNOSIS — N90.4 LICHEN SCLEROSUS ET ATROPHICUS OF THE VULVA: ICD-10-CM

## 2019-11-12 DIAGNOSIS — B96.89 BV (BACTERIAL VAGINOSIS): Primary | ICD-10-CM

## 2019-11-12 PROCEDURE — 87210 PR  SMEAR,STAIN,WET MNT,INTERP: ICD-10-PCS | Mod: QW,S$GLB,, | Performed by: OBSTETRICS & GYNECOLOGY

## 2019-11-12 PROCEDURE — 87801 DETECT AGNT MULT DNA AMPLI: CPT

## 2019-11-12 PROCEDURE — 87220 PR  TISSUE EXAM BY KOH: ICD-10-PCS | Mod: S$GLB,,, | Performed by: OBSTETRICS & GYNECOLOGY

## 2019-11-12 PROCEDURE — 99999 PR PBB SHADOW E&M-EST. PATIENT-LVL III: ICD-10-PCS | Mod: PBBFAC,,, | Performed by: OBSTETRICS & GYNECOLOGY

## 2019-11-12 PROCEDURE — 87220 TISSUE EXAM FOR FUNGI: CPT | Mod: S$GLB,,, | Performed by: OBSTETRICS & GYNECOLOGY

## 2019-11-12 PROCEDURE — 3008F PR BODY MASS INDEX (BMI) DOCUMENTED: ICD-10-PCS | Mod: CPTII,S$GLB,, | Performed by: OBSTETRICS & GYNECOLOGY

## 2019-11-12 PROCEDURE — 87210 SMEAR WET MOUNT SALINE/INK: CPT | Mod: QW,S$GLB,, | Performed by: OBSTETRICS & GYNECOLOGY

## 2019-11-12 PROCEDURE — 99999 PR PBB SHADOW E&M-EST. PATIENT-LVL III: CPT | Mod: PBBFAC,,, | Performed by: OBSTETRICS & GYNECOLOGY

## 2019-11-12 PROCEDURE — 99214 PR OFFICE/OUTPT VISIT, EST, LEVL IV, 30-39 MIN: ICD-10-PCS | Mod: 25,S$GLB,, | Performed by: OBSTETRICS & GYNECOLOGY

## 2019-11-12 PROCEDURE — 3008F BODY MASS INDEX DOCD: CPT | Mod: CPTII,S$GLB,, | Performed by: OBSTETRICS & GYNECOLOGY

## 2019-11-12 PROCEDURE — 99214 OFFICE O/P EST MOD 30 MIN: CPT | Mod: 25,S$GLB,, | Performed by: OBSTETRICS & GYNECOLOGY

## 2019-11-12 PROCEDURE — 87481 CANDIDA DNA AMP PROBE: CPT | Mod: 59

## 2019-11-12 RX ORDER — METRONIDAZOLE 500 MG/1
2000 TABLET ORAL ONCE
Qty: 4 TABLET | Refills: 1 | Status: SHIPPED | OUTPATIENT
Start: 2019-11-12 | End: 2019-11-12

## 2019-11-12 RX ORDER — METRONIDAZOLE 7.5 MG/G
1 GEL VAGINAL NIGHTLY
Qty: 70 G | Refills: 3 | Status: SHIPPED | OUTPATIENT
Start: 2019-11-12 | End: 2019-11-17

## 2019-11-12 RX ORDER — CLOBETASOL PROPIONATE 0.5 MG/G
CREAM TOPICAL 2 TIMES DAILY
Qty: 45 G | Refills: 3 | Status: SHIPPED | OUTPATIENT
Start: 2019-11-12 | End: 2020-11-11

## 2019-11-12 NOTE — PROGRESS NOTES
Chief Complaint   Patient presents with    Vulvar Itch     Infection per pt       History of Present Illness   59 y.o.  female patient presents today for internal and external vaginal itching and irritation x 3-4 weeks, douching regularly, counseled.     Past medical and surgical history reviewed.   I have reviewed the patient's medical history in detail and updated the computerized patient record.    Review of patient's allergies indicates:  No Known Allergies      Review of Systems - Negative except HPI  GEN ROS: negative for - chills or fever  Breast ROS: negative for breast lumps  Genito-Urinary ROS: no dysuria, trouble voiding, or hematuria      Physical Examination:  /78   Wt 67.3 kg (148 lb 5.9 oz)   BMI 29.97 kg/m²    Constitutional: She is oriented to person, place, and time. She appears well-developed and well-nourished. No distress.  HENT:   Head: Normocephalic and atraumatic.   Eyes: Conjunctivae and EOM are normal. No scleral icterus.   Neck: Normal range of motion. Neck supple. No tracheal deviation present.   Cardiovascular: Normal rate.    Pulmonary/Chest: Effort normal. No respiratory distress. She exhibits no tenderness.  Abdominal: Soft. She exhibits no distension and no mass. There is no tenderness. There is no rebound and no guarding.   Genitourinary:    External rectal exam shows no thrombosed external hemorrhoids.    Pelvic exam was performed with patient supine.   No labial fusion. Hypopigmentation throughout.    There is no injury on the right labia.   There is no injury on the left labia.   No bleeding and no signs of injury around the vaginal introitus, urethra is without lesions and well supported.    No vaginal discharge found. Wetprep: clue cells, no yeast or trichomoands, KOH confirms   No significant Cystocele, Enterocele or rectocele, and cuff well supported.   Bimanual exam:   The urethra and vagina are without palpable masses or tenderness.   Uterus and cervix are  surgically absents, vaginal cuff is intact and well supported.   Right adnexum displays no mass and no tenderness.   Left adnexum displays no mass and no tenderness.  Musculoskeletal: Normal range of motion.   Neurological: She is alert and oriented to person, place, and time. Coordination normal.   Skin: Skin is warm and dry. She is not diaphoretic.   Psychiatric: She has a normal mood and affect.          Assessment:  58yo, lichen sclerosis and recurrent bacterial vaginosis     Plan:  Affirm,   metrogel and flagyl po  Topical clobetasol taper  Follow up 3-4 weeks if not improved.   Patient informed will be contacted with results within 2 weeks. Encouraged to please call back or email if she has not heard from us by then.

## 2019-11-13 ENCOUNTER — TELEPHONE (OUTPATIENT)
Dept: OBSTETRICS AND GYNECOLOGY | Facility: CLINIC | Age: 59
End: 2019-11-13

## 2019-11-13 NOTE — TELEPHONE ENCOUNTER
rx resent to [facility] for Clobetasol , patient advised    ----- Message from Margoth Miller sent at 11/13/2019  2:05 PM CST -----  Contact: Pt  Type: Needs Medical Advice    Who Called:  Pt  Best Call Back Number: [phone]  Additional Information: Requesting a call back regarding pt external cream that was prescribed . Pt stated that [facility] said they didn't receive anything on an external cream   Please Advise ---Thank you

## 2019-11-13 NOTE — TELEPHONE ENCOUNTER
----- Message from Colten Parra sent at 11/13/2019  1:15 PM CST -----  Contact: pt  Type:  Patient Returning Call    Who Called:  pt  Who Left Message for Patient:  deidra  Does the patient know what this is regarding?:  no  Best Call Back Number:  [phone]  Additional Information:

## 2019-11-13 NOTE — TELEPHONE ENCOUNTER
Spoke with patient advised that [facility] said patient picked up all RX's , patient did not agree. Instructed patient to contact them to discuss. Records from them show patient medication sent to pharmacy and was recieved      Left message to call office    Checked with Pharmacy, their records show patient picked up both Clobetasol and Metrogel. Directions for Flagyl all 4 pills at once.        ----- Message from Yennifer Stewart sent at 11/13/2019  8:53 AM CST -----  Contact: pt  Pt states that she was in on yesterday and before she takes the medication he called in for her she would like to clarify. Pt thinks the pharmacy made a mistake. The medication-metroNIDAZOLE (FLAGYL) 500 MG tablet, clarification on the directions- Take 4 tablets (2,000 mg total) by mouth once. for 1 dose. Pt said the Dr said 1 pill and the pharmacy has 4 pills to take at 1 time. Also Dr had mention he was going to give her an external cream and the pharmacy saying it was not called in. The Dr was supposed to give her 2 creams...[phone] (home)

## 2019-11-14 LAB
BACTERIAL VAGINOSIS DNA: NEGATIVE
CANDIDA GLABRATA DNA: NEGATIVE
CANDIDA KRUSEI DNA: NEGATIVE
CANDIDA RRNA VAG QL PROBE: NEGATIVE
T VAGINALIS RRNA GENITAL QL PROBE: NEGATIVE

## 2020-03-23 RX ORDER — ESTRADIOL 1 MG/1
TABLET ORAL
Qty: 30 TABLET | Refills: 3 | Status: SHIPPED | OUTPATIENT
Start: 2020-03-23

## 2020-04-08 ENCOUNTER — TELEPHONE (OUTPATIENT)
Dept: NEUROLOGY | Facility: CLINIC | Age: 60
End: 2020-04-08

## 2020-05-15 RX ORDER — ESTRADIOL 1 MG/1
1 TABLET ORAL DAILY
Qty: 30 TABLET | Refills: 0 | OUTPATIENT
Start: 2020-05-15 | End: 2020-06-14

## 2020-05-15 NOTE — TELEPHONE ENCOUNTER
----- Message from Cadence Demarco sent at 5/15/2020  9:28 AM CDT -----  Type: Needs Medical Advice  Who Called:  patient  Symptoms (please be specific):  Discharge, frequent urination, and feels pressure   How long has patient had these symptoms:  3 days  Pharmacy name and phone #:    [id number] - [place], [place] - [address] [facility]  [address] [facility] [address]  Phone: [phone] Fax: [phone]  Best Call Back Number: [phone]  Additional Information: patient states that she is afraid to come to the office due to COVID-19 concerns and is requesting medication. Please give call back

## 2020-05-15 NOTE — TELEPHONE ENCOUNTER
Patient with c/o bladder pressure currently in [place], requesting urine culture to be sent to [facility] in [place], [place].  Advised patient would be best to go to Urgent Care for advise but wants to wait till Monday to see what Dr. Venegas has to say: please advise                  ----- Message from Cadence Demarco sent at 5/15/2020  9:28 AM CDT -----  Type: Needs Medical Advice  Who Called:  patient  Symptoms (please be specific):  Discharge, frequent urination, and feels pressure   How long has patient had these symptoms:  3 days  Pharmacy name and phone #:    [id number] - [place], [place] - [address] AT [facility]  [address] [address]  Phone: [phone] Fax: [phone]  Best Call Back Number: [phone]  Additional Information: patient states that she is afraid to come to the office due to COVID-19 concerns and is requesting medication. Please give call back

## 2021-08-02 ENCOUNTER — TELEPHONE (OUTPATIENT)
Dept: OBSTETRICS AND GYNECOLOGY | Facility: CLINIC | Age: 61
End: 2021-08-02

## 2022-04-11 ENCOUNTER — TELEPHONE (OUTPATIENT)
Dept: OBSTETRICS AND GYNECOLOGY | Facility: CLINIC | Age: 62
End: 2022-04-11
Payer: COMMERCIAL

## 2022-04-11 NOTE — TELEPHONE ENCOUNTER
----- Message from Breanna Argueta sent at 4/9/2022 10:17 AM CDT -----  Contact: pt at [phone]  Type:  Sooner Appointment Request    Caller is requesting a sooner appointment.  Caller declined first available appointment listed below.  Caller will not accept being placed on the waitlist and is requesting a message be sent to doctor.    Name of Caller:  pt  When is the first available appointment?  4/25/22  Symptoms:  Discomfort and burning when urinating  Best Call Back Number:  [phone]  Additional Information:  pt is calling the office to be seen on 4/12/22 but the date of 4/25/22 comes up instead. Please call back and advise.

## 2024-05-03 ENCOUNTER — TELEPHONE (OUTPATIENT)
Dept: OBSTETRICS AND GYNECOLOGY | Facility: CLINIC | Age: 64
End: 2024-05-03
Payer: COMMERCIAL

## 2024-05-03 NOTE — TELEPHONE ENCOUNTER
----- Message from Chris Villasenor sent at 5/3/2024 12:36 PM CDT -----  Contact: Self  Type:  Sooner Appointment Request    Caller is requesting a sooner appointment.  Caller declined first available appointment listed below.  Caller will not accept being placed on the waitlist and is requesting a message be sent to doctor.    Name of Caller:  Patient  When is the first available appointment?  N/a  Symptoms:  UTI  Would the patient rather a call back or a response via [facility]? Call  Best Call Back Number:  [phone]   Additional Information: Refused NP appt, LOV 11/12/2019

## 2024-05-03 NOTE — TELEPHONE ENCOUNTER
Patient having UTI symptoms and vaginal odor for a while. Appt scheduled on 5-6-2024 at 10:20 am, patient verb understanding.

## 2024-05-06 ENCOUNTER — OFFICE VISIT (OUTPATIENT)
Dept: OBSTETRICS AND GYNECOLOGY | Facility: CLINIC | Age: 64
End: 2024-05-06
Payer: COMMERCIAL

## 2024-05-06 VITALS
HEIGHT: 59 IN | BODY MASS INDEX: 29.68 KG/M2 | WEIGHT: 147.25 LBS | RESPIRATION RATE: 18 BRPM | SYSTOLIC BLOOD PRESSURE: 118 MMHG | DIASTOLIC BLOOD PRESSURE: 76 MMHG

## 2024-05-06 DIAGNOSIS — N76.0 ACUTE VAGINITIS: ICD-10-CM

## 2024-05-06 DIAGNOSIS — R39.9 UTI SYMPTOMS: Primary | ICD-10-CM

## 2024-05-06 LAB
BILIRUBIN, UA POC OHS: NEGATIVE
BLOOD, UA POC OHS: ABNORMAL
CLARITY, UA POC OHS: CLEAR
COLOR, UA POC OHS: YELLOW
GLUCOSE, UA POC OHS: NEGATIVE
KETONES, UA POC OHS: NEGATIVE
LEUKOCYTES, UA POC OHS: NEGATIVE
NITRITE, UA POC OHS: NEGATIVE
PH, UA POC OHS: 5.5
PROTEIN, UA POC OHS: NEGATIVE
SPECIFIC GRAVITY, UA POC OHS: >=1.03
UROBILINOGEN, UA POC OHS: 0.2

## 2024-05-06 PROCEDURE — 81003 URINALYSIS AUTO W/O SCOPE: CPT | Mod: QW,S$GLB,, | Performed by: OBSTETRICS & GYNECOLOGY

## 2024-05-06 PROCEDURE — 81514 NFCT DS BV&VAGINITIS DNA ALG: CPT | Performed by: OBSTETRICS & GYNECOLOGY

## 2024-05-06 PROCEDURE — 87086 URINE CULTURE/COLONY COUNT: CPT | Performed by: OBSTETRICS & GYNECOLOGY

## 2024-05-06 PROCEDURE — 99999 PR PBB SHADOW E&M-EST. PATIENT-LVL IV: CPT | Mod: PBBFAC,,, | Performed by: OBSTETRICS & GYNECOLOGY

## 2024-05-06 PROCEDURE — 1159F MED LIST DOCD IN RCRD: CPT | Mod: CPTII,S$GLB,, | Performed by: OBSTETRICS & GYNECOLOGY

## 2024-05-06 PROCEDURE — 3074F SYST BP LT 130 MM HG: CPT | Mod: CPTII,S$GLB,, | Performed by: OBSTETRICS & GYNECOLOGY

## 2024-05-06 PROCEDURE — 99213 OFFICE O/P EST LOW 20 MIN: CPT | Mod: S$GLB,,, | Performed by: OBSTETRICS & GYNECOLOGY

## 2024-05-06 PROCEDURE — 3008F BODY MASS INDEX DOCD: CPT | Mod: CPTII,S$GLB,, | Performed by: OBSTETRICS & GYNECOLOGY

## 2024-05-06 PROCEDURE — 3078F DIAST BP <80 MM HG: CPT | Mod: CPTII,S$GLB,, | Performed by: OBSTETRICS & GYNECOLOGY

## 2024-05-06 RX ORDER — PHENAZOPYRIDINE HYDROCHLORIDE 200 MG/1
200 TABLET, FILM COATED ORAL 3 TIMES DAILY PRN
Qty: 12 TABLET | Refills: 0 | Status: SHIPPED | OUTPATIENT
Start: 2024-05-06 | End: 2024-05-16

## 2024-05-06 NOTE — PROGRESS NOTES
"  Chief Complaint   Patient presents with    vaginal odor    PELVIC PRESSURE    Urinary Frequency       History of Present Illness   63 y.o.  female   patient presents today for vaginal odor and d/c last week or so, u/a -trace blood. H/o IC. On ovybutin for urge incontinence. No Vaginal Bleeding     Past medical and surgical history reviewed.   I have reviewed the patient's medical history in detail and updated the computerized patient record.    Review of patient's allergies indicates:  No Known Allergies      Review of Systems - Negative except HPI  GEN ROS: negative for - chills or fever  Breast ROS: negative for breast lumps  Genito-Urinary ROS: no dysuria, trouble voiding, or hematuria      Physical Examination:  /76   Resp 18   Ht 4' 11" (1.499 m)   Wt 66.8 kg (147 lb 4.3 oz)   BMI 29.74 kg/m²    Constitutional: She is oriented to person, place, and time. She appears well-developed and well-nourished. No distress.   HENT:   Head: Normocephalic and atraumatic.   Eyes: Conjunctivae and EOM are normal. No scleral icterus.   Neck: Normal range of motion. Neck supple. No tracheal deviation present.   Cardiovascular: Normal rate.    Pulmonary/Chest: Effort normal. No respiratory distress. She exhibits no tenderness.  Abdominal: Soft. She exhibits no distension and no mass. There is no tenderness. There is no rebound and no guarding.   Genitourinary:    External rectal exam shows no thrombosed external hemorrhoids.    Pelvic exam was performed with patient supine.   No labial fusion.   There is no rash, lesion or injury on the right labia.   There is no rash, lesion or injury on the left labia.   No bleeding and no signs of injury around the vaginal introitus, urethra is without lesions and well supported.    No vaginal discharge found.    No significant Cystocele, Enterocele or rectocele, and cuff well supported.   Bimanual exam:   The urethra and vagina are without palpable masses or " tenderness. Tender at cuff only.    Uterus and cervix are surgically absents, vaginal cuff is intact and well supported.   Right adnexum displays no mass and no tenderness.   Left adnexum displays no mass and no tenderness.  Musculoskeletal: Normal range of motion.   Lymphadenopathy: No inguinal adenopathy present.   Neurological: She is alert and oriented to person, place, and time. Coordination normal.   Skin: Skin is warm and dry. She is not diaphoretic.   Psychiatric: She has a normal mood and affect.      Assessment:  Vaginal d/c odor  1. UTI symptoms  POCT Urinalysis(Instrument)          Plan:  Ucx, vag cx  Pyridium x 24 hours  Treat as needed, does not tolerate vaginal prep meds.    Patient informed will be contacted with results within 2 weeks. Encouraged to please call back or email if she has not heard from us by then.

## 2024-05-08 ENCOUNTER — PATIENT MESSAGE (OUTPATIENT)
Dept: OBSTETRICS AND GYNECOLOGY | Facility: CLINIC | Age: 64
End: 2024-05-08
Payer: COMMERCIAL

## 2024-05-08 ENCOUNTER — TELEPHONE (OUTPATIENT)
Dept: OBSTETRICS AND GYNECOLOGY | Facility: CLINIC | Age: 64
End: 2024-05-08
Payer: COMMERCIAL

## 2024-05-08 DIAGNOSIS — N30.10 CHRONIC INTERSTITIAL CYSTITIS: Primary | ICD-10-CM

## 2024-05-08 LAB — BACTERIA UR CULT: NORMAL

## 2024-05-08 NOTE — TELEPHONE ENCOUNTER
----- Message from Moses Venegas MD sent at 5/8/2024  8:35 AM CDT -----  Regarding: urine cul;ture  Please ifnorm urine culture negative for infection, probable interstitial cystitis flare  Recommend follow up with urology.  
Attempted to contact pt. Voice mail box full.  
well nourished

## 2024-05-14 ENCOUNTER — TELEPHONE (OUTPATIENT)
Dept: UROLOGY | Facility: CLINIC | Age: 64
End: 2024-05-14
Payer: COMMERCIAL

## 2024-05-14 NOTE — TELEPHONE ENCOUNTER
----- Message from Breanna Argueta sent at 5/14/2024 10:37 AM CDT -----  Type: Needs Medical Advice  Who Called:  john  Best Call Back Number: [phone]  Additional Information: pt is calling the office to have her appt moved to the week of the 27nd of May. Please call back to advise. Thanks!  
Detail Level: Zone
Spoke with patient , rescheduled her appt with Dr Breen for chronic IC . Patient expressed understanding.   
Detail Level: Generalized

## 2024-11-26 ENCOUNTER — OFFICE VISIT (OUTPATIENT)
Dept: UROLOGY | Facility: CLINIC | Age: 64
End: 2024-11-26
Payer: COMMERCIAL

## 2024-11-26 VITALS — WEIGHT: 149.5 LBS | BODY MASS INDEX: 30.19 KG/M2

## 2024-11-26 DIAGNOSIS — R39.15 URINARY URGENCY: ICD-10-CM

## 2024-11-26 DIAGNOSIS — N39.3 STRESS INCONTINENCE: ICD-10-CM

## 2024-11-26 DIAGNOSIS — R10.2 PELVIC PAIN IN FEMALE: Primary | ICD-10-CM

## 2024-11-26 DIAGNOSIS — R35.0 URINARY FREQUENCY: ICD-10-CM

## 2024-11-26 DIAGNOSIS — N32.81 OAB (OVERACTIVE BLADDER): ICD-10-CM

## 2024-11-26 LAB
BILIRUBIN, UA POC OHS: NEGATIVE
BLOOD, UA POC OHS: ABNORMAL
CLARITY, UA POC OHS: ABNORMAL
COLOR, UA POC OHS: YELLOW
GLUCOSE, UA POC OHS: NEGATIVE
KETONES, UA POC OHS: NEGATIVE
LEUKOCYTES, UA POC OHS: NEGATIVE
NITRITE, UA POC OHS: NEGATIVE
PH, UA POC OHS: 5.5
POC RESIDUAL URINE VOLUME: 8 ML (ref 0–100)
PROTEIN, UA POC OHS: NEGATIVE
SPECIFIC GRAVITY, UA POC OHS: 1.01
UROBILINOGEN, UA POC OHS: 0.2

## 2024-11-26 PROCEDURE — 87086 URINE CULTURE/COLONY COUNT: CPT

## 2024-11-26 PROCEDURE — 99999 PR PBB SHADOW E&M-EST. PATIENT-LVL III: CPT | Mod: PBBFAC,,,

## 2024-11-26 RX ORDER — OXYBUTYNIN CHLORIDE 10 MG/1
10 TABLET, EXTENDED RELEASE ORAL DAILY
Qty: 30 TABLET | Refills: 11 | Status: SHIPPED | OUTPATIENT
Start: 2024-11-26 | End: 2025-11-26

## 2024-11-26 NOTE — PROGRESS NOTES
"[facility] Note  Staff: Yvonne Peterson FNP-C    PCP: MD Yasemin    Chief Complaint: Incontinence    Subjective:        HPI: Brenna Gaytan is a 64 y.o. female NEW PATIENT presents today with multiple complaints. She states she leaks urine at random times throughout the day and with laughing, coughing, and sneezing. She states she is wearing pads but is unsure how many she uses in a day. We discussed pelvic floor PT for stress incontinence, but she refuses. She states she "wants a medication". Discussed medications will not alleviate stress incontinence.    She also states she has a history of IC. She states she mainly likes in [place] and is followed by urology there. She states she just underwent cystoscopy last year, which was unremarkable. She states she was told to have a cystoscopy every 2 years for IC. These records are not available to review.    She also complains of a vaginal odor. She states this is very embarrassing for her. It appears she was seen by Dr. Venegas for the same in May 2024, all testing was negative including a urine culture. Discussed with her urology does not treat a vaginal odor.    She is taking oxybutynin 10mg XL daily for urgency and frequency and is requesting refills. She states she has been on this medication for years and it works well for her. She denies urge incontinence. She denies dysuria, hematuria, fever, flank pain, and difficulty urinating.     Questions asked pt during office visit today:  Urgency:Yes - at times, incontinence with urgency? No;   Incontinence with laughing or straining: Yes ;   DysuriaNo  Gross HematuriaNo  History of UTI: No    History of Kidney Stones?:  no    Constipation issues?:  no    PVR by bladder scan performed by MA today:  8 mL    REVIEW OF SYSTEMS:  Review of Systems   Constitutional: Negative.  Negative for chills and fever.   Gastrointestinal: Negative.  Negative for abdominal pain, constipation, diarrhea, nausea and vomiting. "   Genitourinary:  Positive for frequency and urgency. Negative for dysuria, flank pain and hematuria.   Musculoskeletal: Negative.  Negative for back pain.       PMHx:  Past Medical History:   Diagnosis Date    Hyperlipidemia     MALT lymphoma        PSHx:  Past Surgical History:   Procedure Laterality Date    APPENDECTOMY      CHOLECYSTECTOMY      HYSTERECTOMY      OOPHORECTOMY         Fam Hx:   malignancies: No , gyn malignancies: Yes - maternal grandmother breast cancer   kidney stones: No     Soc Hx:  , lives in [place]    Allergies:  Patient has no known allergies.    Medications: reviewed     Objective:   There were no vitals filed for this visit.    Physical Exam  Constitutional:       Appearance: Normal appearance.   Pulmonary:      Effort: Pulmonary effort is normal.   Abdominal:      General: There is no distension.      Palpations: Abdomen is soft.      Tenderness: There is no abdominal tenderness.   Musculoskeletal:         General: Normal range of motion.      Cervical back: Normal range of motion.   Skin:     General: Skin is warm.   Neurological:      Mental Status: She is oriented to person, place, and time.   Psychiatric:         Mood and Affect: Mood normal.         Behavior: Behavior normal.         LABS REVIEW:  UA today:   Color:Clear, Yellow  Spec. Grav.  1.015  PH  5.5  Negative for leukocytes, nitrates, protein, glucose, ketones, urobili, bili  Moderate blood    Assessment:       1. Pelvic pain in female    2. Urinary frequency    3. Urinary urgency    4. OAB (overactive bladder)          Plan:      Urine sent for culture  CT ABD/Pelvis wo contrast ordered and scheduled  Pt refuses pelvic floor PT    F/u per treatment plan    [facility]: Pending    MORGAN Castro

## 2024-11-27 LAB
BACTERIA UR CULT: NORMAL
BACTERIA UR CULT: NORMAL

## 2024-12-02 ENCOUNTER — TELEPHONE (OUTPATIENT)
Dept: UROLOGY | Facility: CLINIC | Age: 64
End: 2024-12-02
Payer: COMMERCIAL

## 2024-12-02 NOTE — TELEPHONE ENCOUNTER
Pt verbalizes understanding    ----- Message from Yvonne Peterson NP sent at 12/2/2024  8:32 AM CST -----  Urine shows contamination, no bacteria seen or infection

## 2024-12-03 ENCOUNTER — TELEPHONE (OUTPATIENT)
Dept: UROLOGY | Facility: CLINIC | Age: 64
End: 2024-12-03
Payer: COMMERCIAL

## 2024-12-03 ENCOUNTER — HOSPITAL ENCOUNTER (OUTPATIENT)
Dept: RADIOLOGY | Facility: HOSPITAL | Age: 64
Discharge: HOME | End: 2024-12-03
Payer: COMMERCIAL

## 2024-12-03 DIAGNOSIS — R39.15 URINARY URGENCY: ICD-10-CM

## 2024-12-03 DIAGNOSIS — N32.81 OAB (OVERACTIVE BLADDER): ICD-10-CM

## 2024-12-03 DIAGNOSIS — R35.0 URINARY FREQUENCY: ICD-10-CM

## 2024-12-03 PROCEDURE — 74176 CT ABD & PELVIS W/O CONTRAST: CPT | Mod: 26,,, | Performed by: RADIOLOGY

## 2024-12-03 PROCEDURE — 74176 CT ABD & PELVIS W/O CONTRAST: CPT | Mod: TC,PO

## 2024-12-03 NOTE — TELEPHONE ENCOUNTER
Called pt to give her the results, pt verbalized understanding. QR----- Message from Yvonne Peterson NP sent at 12/3/2024  9:45 AM CST -----  CT clear of any urological abnormalities- kidneys and bladder normal. Keep appt with berta as scheduled

## 2025-05-26 ENCOUNTER — OFFICE VISIT (OUTPATIENT)
Dept: UROLOGY | Facility: CLINIC | Age: 65
End: 2025-05-26
Payer: COMMERCIAL

## 2025-05-26 VITALS — BODY MASS INDEX: 29.95 KG/M2 | WEIGHT: 148.56 LBS | HEIGHT: 59 IN

## 2025-05-26 DIAGNOSIS — N32.81 OAB (OVERACTIVE BLADDER): Primary | ICD-10-CM

## 2025-05-26 DIAGNOSIS — N39.3 STRESS INCONTINENCE: ICD-10-CM

## 2025-05-26 PROCEDURE — 99999 PR PBB SHADOW E&M-EST. PATIENT-LVL III: CPT | Mod: PBBFAC,,, | Performed by: UROLOGY

## 2025-05-26 PROCEDURE — 3008F BODY MASS INDEX DOCD: CPT | Mod: CPTII,S$GLB,, | Performed by: UROLOGY

## 2025-05-26 PROCEDURE — 1159F MED LIST DOCD IN RCRD: CPT | Mod: CPTII,S$GLB,, | Performed by: UROLOGY

## 2025-05-26 PROCEDURE — G2211 COMPLEX E/M VISIT ADD ON: HCPCS | Mod: S$GLB,,, | Performed by: UROLOGY

## 2025-05-26 PROCEDURE — 99214 OFFICE O/P EST MOD 30 MIN: CPT | Mod: S$GLB,,, | Performed by: UROLOGY

## 2025-05-26 RX ORDER — MIRABEGRON 50 MG/1
50 TABLET, FILM COATED, EXTENDED RELEASE ORAL DAILY
Qty: 30 TABLET | Refills: 11 | Status: SHIPPED | OUTPATIENT
Start: 2025-05-26 | End: 2026-05-26

## 2025-05-26 NOTE — PROGRESS NOTES
[facility]      New Overactive Bladder (OAB) Note    5/26/2025    Referred by:  No ref. provider found    The patient has not been previously seen by a specialist board-certified in Female Urology/Urogynecology (Health Care Provider Taxonomy code [id number]) in our system previously and is meeting with me today for specialty care.     History of Present Illness    CHIEF COMPLAINT:  Patient presents with ongoing urinary urgency, frequency, and occasional incontinence, seeking evaluation and management of her overactive bladder symptoms.    HPI:  Patient reports a history of urinary incontinence, primarily urgency incontinence. She describes discomfort across her lower abdomen, lower back pain, and frequent urination. She also mentions a discharge that she initially mistook for urine leakage. She reports urgency symptoms with occasional urinary incontinence due to sudden urges, sometimes with minimal warning before needing to urinate, necessitating immediate bathroom access.    Patient urinates about 5-6 times during the day and gets up twice at night to urinate. In the past week, the urgency has been particularly bothersome, with occasional sudden sensations of bladder fullness. She is currently taking oxybutynin 10 mg daily for overactive bladder, which is the first medication she has tried for this condition.    She mentions a recent cruise to [place], during which she self-medicated with Bactrim, believing it might help with a potential infection. She reports that the symptoms improved after taking Bactrim, but she is unsure if it was directly related to the medication.    Her last cystoscopy was in 2023, and she has a history of interstitial cystitis. Her most recent upper urinary tract imaging was in 2024, which showed non-obstructive left urolithiasis measuring 2 mm in size.    She denies difficulty urinating, pushing, or straining.    MEDICATIONS:  Patient is on Oxybutynin (Ditropan) 10 mg  daily for overactive bladder.    MEDICAL HISTORY:  Patient has a history of urinary incontinence, specifically urgency incontinence. She also has interstitial cystitis, with her last cystoscopy performed in . She has overactive bladder, for which she is taking medication (oxybutynin). In  imaging, a 2mm left urolithiasis was noted. Patient also has fatty liver.    TEST RESULTS:  She underwent a cystoscopy in , though the specific results were not provided. Upper urinary tract imagin, not obstructive, left urolithiasis 2mm in size, no need for intervention, no other acute abnormalities          A review of 10+ systems was conducted with pertinent positive and negative findings documented in HPI with all other systems reviewed and negative.    Past medical, family, surgical and social history reviewed as documented in chart with pertinent positive medical, family, surgical and social history detailed in HPI.    Previous OAB therapies:  Behavioral Therapies  : (fluid/dietary modification) -  provided some but insufficient benefit  Medications  oral oxybutynin ER 10 mg >12 months (provided some but insufficient benefit)  Other Therapies  No Other Therapies    Previous LONG therapies:  no previous therapy      Exam Findings:    Physical Exam    General: No acute distress. Nontoxic appearing.  HENT: Normocephalic. Atraumatic.  Respiratory: Normal respiratory effort. No conversational dyspnea. No audible wheezing.  Abdomen: No obvious distension.  Skin: No visible abnormalities.  Extremities: No edema upper extremities. No edema lower extremities.  Neurological: Alert and oriented x3. Normal speech.  Psychiatric: Normal mood. Normal affect. No evidence of SI.          Assessment/Plan:    Assessment & Plan    N32.81 OAB (overactive bladder)  N39.3 Stress incontinence    IMPRESSION:  Assessed overactive bladder symptoms, noting current treatment with oxybutynin 10 mg daily is not providing adequate symptom  control.  Considered changing to a different class of overactive bladder medication, as oxybutynin may not be the most effective option.  Determined that liver function is not a limiting factor in medication selection for overactive bladder treatment.    PLAN SUMMARY:  - Discontinued oxybutynin 10 mg daily  - Started new overactive bladder medication  - Limit intake of bladder irritants (lemonade, orange juice, tomato sauce)  - Moderate caffeine intake to <100 mg per day  - Cut off fluid intake 3-4 hours before bedtime  - Follow up in a few weeks to assess effectiveness of new medication    OAB (OVERACTIVE BLADDER):  - Explained that overactive bladder is extremely common, affecting between 43 and 47 million Americans.  - Clarified that urgency incontinence is a common symptom of overactive bladder.  - Informed patient that overactive bladder medications can take several weeks to have an impact.  - Recommend moderating caffeine intake to less than 100 mg per day (1-2 cups of coffee).  - Recommend limiting intake of bladder irritants such as lemonade, orange juice, and tomato sauce.  - Recommend cutting off fluid intake 3-4 hours before bedtime to help with nighttime symptoms.  - Discontinued oxybutynin 10 mg daily and started new overactive bladder medication to improve symptom management.  - Follow up in a few weeks to assess effectiveness of new medication.              Visit complexity today is associated with medical care services that are part of the ongoing care related to the single serious and/or complex condition of Overactive bladder (OAB). A longitudinal relationship exists or is being developed between the patient and this practitioner for the care of this condition.

## 2025-06-12 ENCOUNTER — TELEPHONE (OUTPATIENT)
Dept: UROLOGY | Facility: CLINIC | Age: 65
End: 2025-06-12
Payer: COMMERCIAL

## 2025-06-12 DIAGNOSIS — N32.81 OAB (OVERACTIVE BLADDER): ICD-10-CM

## 2025-06-12 RX ORDER — MIRABEGRON 50 MG/1
50 TABLET, FILM COATED, EXTENDED RELEASE ORAL DAILY
Qty: 30 TABLET | Refills: 11 | Status: SHIPPED | OUTPATIENT
Start: 2025-06-12 | End: 2026-06-12

## 2025-06-12 NOTE — TELEPHONE ENCOUNTER
Judith pt no answer. LVM informing medication was called in to [facility] in [place]. Informed med can be transferred to any pharmacy after.    Copied from CRM #[id number]. Topic: Medications - Medication Question  >> Jun 11, 2025  4:08 PM Dominique wrote:  Type: Needs Medical Advice    Who Called:PT  Best Call Back Number: [phone]  Additional  Information: PT  requesting call back to know prescription mirabegron, will be called into pharm. She  has been waiting since 5-26 contacted [facility], was told they do not have anything for  her,  Please Advise- Thank you

## 2025-06-12 NOTE — TELEPHONE ENCOUNTER
Pt called back. She was extremely upset and requested to speak to manager. Pt stated she has been trying to get medication since May 26, and she has never used an [facility]. Explained to pt that Rx required a PA which is why the Rx was sent to [facility] in [place]. Informed pt Rx can be sent to pharmacy of any choice once PA was completed. Pt did not want to  Rx from [facility] in [place]. Pt wants rx to be sent to [facility] in [place]. Explained to pt that Rx was originally sent to [facility] on file, but medication required a PA. Informed that due to that the med was sent to [facility]. Informed pt mssg will be sen tot doctor to send medication to [facility]. Informed give it time because he is in surgery.